# Patient Record
Sex: MALE | Race: WHITE | NOT HISPANIC OR LATINO | Employment: FULL TIME | URBAN - NONMETROPOLITAN AREA
[De-identification: names, ages, dates, MRNs, and addresses within clinical notes are randomized per-mention and may not be internally consistent; named-entity substitution may affect disease eponyms.]

---

## 2020-01-03 ENCOUNTER — HOSPITAL ENCOUNTER (OUTPATIENT)
Dept: GENERAL RADIOLOGY | Facility: HOSPITAL | Age: 55
Discharge: HOME | End: 2020-01-03
Admitting: NURSE PRACTITIONER

## 2020-01-03 ENCOUNTER — APPOINTMENT (OUTPATIENT)
Dept: CT IMAGING | Facility: HOSPITAL | Age: 55
End: 2020-01-03

## 2020-01-03 ENCOUNTER — HOSPITAL ENCOUNTER (INPATIENT)
Facility: HOSPITAL | Age: 55
LOS: 3 days | Discharge: HOME | End: 2020-01-06
Attending: EMERGENCY MEDICINE | Admitting: SPECIALIST

## 2020-01-03 ENCOUNTER — TRANSCRIBE ORDERS (OUTPATIENT)
Dept: GENERAL RADIOLOGY | Facility: HOSPITAL | Age: 55
End: 2020-01-03

## 2020-01-03 DIAGNOSIS — R10.30 LOWER ABDOMINAL PAIN: ICD-10-CM

## 2020-01-03 DIAGNOSIS — K56.600 PARTIAL SMALL BOWEL OBSTRUCTION (HCC): ICD-10-CM

## 2020-01-03 DIAGNOSIS — R10.30 LOWER ABDOMINAL PAIN: Primary | ICD-10-CM

## 2020-01-03 LAB
ALBUMIN SERPL-MCNC: 4.3 G/DL (ref 3.5–5.2)
ALBUMIN/GLOB SERPL: 1.7 G/DL
ALP SERPL-CCNC: 45 U/L (ref 39–117)
ALT SERPL W P-5'-P-CCNC: 25 U/L (ref 1–41)
ANION GAP SERPL CALCULATED.3IONS-SCNC: 13 MMOL/L (ref 5–15)
APTT PPP: 29.7 SECONDS (ref 24.1–35)
AST SERPL-CCNC: 28 U/L (ref 1–40)
BASOPHILS # BLD AUTO: 0.02 10*3/MM3 (ref 0–0.2)
BASOPHILS NFR BLD AUTO: 0.5 % (ref 0–1.5)
BILIRUB SERPL-MCNC: 0.6 MG/DL (ref 0.2–1.2)
BUN BLD-MCNC: 13 MG/DL (ref 6–20)
BUN/CREAT SERPL: 17.8 (ref 7–25)
CALCIUM SPEC-SCNC: 9 MG/DL (ref 8.6–10.5)
CHLORIDE SERPL-SCNC: 102 MMOL/L (ref 98–107)
CO2 SERPL-SCNC: 23 MMOL/L (ref 22–29)
CREAT BLD-MCNC: 0.73 MG/DL (ref 0.76–1.27)
D-LACTATE SERPL-SCNC: 0.7 MMOL/L (ref 0.5–2)
DEPRECATED RDW RBC AUTO: 36.6 FL (ref 37–54)
EOSINOPHIL # BLD AUTO: 0.07 10*3/MM3 (ref 0–0.4)
EOSINOPHIL NFR BLD AUTO: 1.6 % (ref 0.3–6.2)
ERYTHROCYTE [DISTWIDTH] IN BLOOD BY AUTOMATED COUNT: 11.4 % (ref 12.3–15.4)
GFR SERPL CREATININE-BSD FRML MDRD: 113 ML/MIN/1.73
GLOBULIN UR ELPH-MCNC: 2.6 GM/DL
GLUCOSE BLD-MCNC: 120 MG/DL (ref 65–99)
HCT VFR BLD AUTO: 40.7 % (ref 37.5–51)
HGB BLD-MCNC: 14.8 G/DL (ref 13–17.7)
HOLD SPECIMEN: NORMAL
HOLD SPECIMEN: NORMAL
IMM GRANULOCYTES # BLD AUTO: 0.01 10*3/MM3 (ref 0–0.05)
IMM GRANULOCYTES NFR BLD AUTO: 0.2 % (ref 0–0.5)
INR PPP: 0.93 (ref 0.91–1.09)
LIPASE SERPL-CCNC: 19 U/L (ref 13–60)
LYMPHOCYTES # BLD AUTO: 0.88 10*3/MM3 (ref 0.7–3.1)
LYMPHOCYTES NFR BLD AUTO: 19.9 % (ref 19.6–45.3)
MCH RBC QN AUTO: 32.1 PG (ref 26.6–33)
MCHC RBC AUTO-ENTMCNC: 36.4 G/DL (ref 31.5–35.7)
MCV RBC AUTO: 88.3 FL (ref 79–97)
MONOCYTES # BLD AUTO: 0.62 10*3/MM3 (ref 0.1–0.9)
MONOCYTES NFR BLD AUTO: 14 % (ref 5–12)
NEUTROPHILS # BLD AUTO: 2.82 10*3/MM3 (ref 1.7–7)
NEUTROPHILS NFR BLD AUTO: 63.8 % (ref 42.7–76)
NRBC BLD AUTO-RTO: 0 /100 WBC (ref 0–0.2)
PLATELET # BLD AUTO: 194 10*3/MM3 (ref 140–450)
PMV BLD AUTO: 10.6 FL (ref 6–12)
POTASSIUM BLD-SCNC: 3.9 MMOL/L (ref 3.5–5.2)
PROT SERPL-MCNC: 6.9 G/DL (ref 6–8.5)
PROTHROMBIN TIME: 12.7 SECONDS (ref 11.9–14.6)
RBC # BLD AUTO: 4.61 10*6/MM3 (ref 4.14–5.8)
SODIUM BLD-SCNC: 138 MMOL/L (ref 136–145)
WBC NRBC COR # BLD: 4.42 10*3/MM3 (ref 3.4–10.8)
WHOLE BLOOD HOLD SPECIMEN: NORMAL
WHOLE BLOOD HOLD SPECIMEN: NORMAL

## 2020-01-03 PROCEDURE — 74177 CT ABD & PELVIS W/CONTRAST: CPT

## 2020-01-03 PROCEDURE — G0378 HOSPITAL OBSERVATION PER HR: HCPCS

## 2020-01-03 PROCEDURE — 94799 UNLISTED PULMONARY SVC/PX: CPT

## 2020-01-03 PROCEDURE — 83605 ASSAY OF LACTIC ACID: CPT | Performed by: EMERGENCY MEDICINE

## 2020-01-03 PROCEDURE — 83690 ASSAY OF LIPASE: CPT | Performed by: EMERGENCY MEDICINE

## 2020-01-03 PROCEDURE — 80053 COMPREHEN METABOLIC PANEL: CPT | Performed by: EMERGENCY MEDICINE

## 2020-01-03 PROCEDURE — 36415 COLL VENOUS BLD VENIPUNCTURE: CPT

## 2020-01-03 PROCEDURE — 85025 COMPLETE CBC W/AUTO DIFF WBC: CPT | Performed by: EMERGENCY MEDICINE

## 2020-01-03 PROCEDURE — 25010000002 MORPHINE PER 10 MG: Performed by: SPECIALIST

## 2020-01-03 PROCEDURE — 25010000002 CEFOXITIN PER 1 G: Performed by: EMERGENCY MEDICINE

## 2020-01-03 PROCEDURE — 99283 EMERGENCY DEPT VISIT LOW MDM: CPT

## 2020-01-03 PROCEDURE — 85610 PROTHROMBIN TIME: CPT | Performed by: EMERGENCY MEDICINE

## 2020-01-03 PROCEDURE — 74018 RADEX ABDOMEN 1 VIEW: CPT

## 2020-01-03 PROCEDURE — 25010000002 IOPAMIDOL 61 % SOLUTION: Performed by: EMERGENCY MEDICINE

## 2020-01-03 PROCEDURE — 25010000002 ENOXAPARIN PER 10 MG: Performed by: SPECIALIST

## 2020-01-03 PROCEDURE — 85730 THROMBOPLASTIN TIME PARTIAL: CPT | Performed by: EMERGENCY MEDICINE

## 2020-01-03 RX ORDER — SODIUM CHLORIDE 0.9 % (FLUSH) 0.9 %
10 SYRINGE (ML) INJECTION AS NEEDED
Status: DISCONTINUED | OUTPATIENT
Start: 2020-01-03 | End: 2020-01-06 | Stop reason: HOSPADM

## 2020-01-03 RX ORDER — CEFOXITIN SODIUM 1 G/50ML
1 INJECTION, SOLUTION INTRAVENOUS EVERY 6 HOURS
Status: DISCONTINUED | OUTPATIENT
Start: 2020-01-03 | End: 2020-01-03 | Stop reason: SDUPTHER

## 2020-01-03 RX ORDER — ONDANSETRON 8 MG/1
8 TABLET, ORALLY DISINTEGRATING ORAL EVERY 6 HOURS PRN
Status: DISCONTINUED | OUTPATIENT
Start: 2020-01-03 | End: 2020-01-06 | Stop reason: HOSPADM

## 2020-01-03 RX ORDER — ONDANSETRON HCL IN 0.9 % NACL 8 MG/50 ML
8 INTRAVENOUS SOLUTION, PIGGYBACK (ML) INTRAVENOUS EVERY 6 HOURS PRN
Status: DISCONTINUED | OUTPATIENT
Start: 2020-01-03 | End: 2020-01-06 | Stop reason: HOSPADM

## 2020-01-03 RX ORDER — SODIUM CHLORIDE 0.9 % (FLUSH) 0.9 %
10 SYRINGE (ML) INJECTION EVERY 12 HOURS SCHEDULED
Status: DISCONTINUED | OUTPATIENT
Start: 2020-01-03 | End: 2020-01-06 | Stop reason: HOSPADM

## 2020-01-03 RX ORDER — DEXTROSE, SODIUM CHLORIDE, AND POTASSIUM CHLORIDE 5; .45; .15 G/100ML; G/100ML; G/100ML
75 INJECTION INTRAVENOUS CONTINUOUS
Status: DISCONTINUED | OUTPATIENT
Start: 2020-01-03 | End: 2020-01-06 | Stop reason: HOSPADM

## 2020-01-03 RX ORDER — SUCRALFATE ORAL 1 G/10ML
1 SUSPENSION ORAL EVERY 6 HOURS SCHEDULED
Status: DISCONTINUED | OUTPATIENT
Start: 2020-01-03 | End: 2020-01-06 | Stop reason: HOSPADM

## 2020-01-03 RX ORDER — SIMETHICONE 80 MG
80 TABLET,CHEWABLE ORAL 4 TIMES DAILY PRN
Status: DISCONTINUED | OUTPATIENT
Start: 2020-01-03 | End: 2020-01-06 | Stop reason: HOSPADM

## 2020-01-03 RX ORDER — LISINOPRIL 20 MG/1
20 TABLET ORAL DAILY
COMMUNITY
End: 2022-11-30

## 2020-01-03 RX ADMIN — MORPHINE SULFATE 4 MG: 4 INJECTION, SOLUTION INTRAMUSCULAR; INTRAVENOUS at 20:33

## 2020-01-03 RX ADMIN — SODIUM CHLORIDE, POTASSIUM CHLORIDE, SODIUM LACTATE AND CALCIUM CHLORIDE 1000 ML: 600; 310; 30; 20 INJECTION, SOLUTION INTRAVENOUS at 20:16

## 2020-01-03 RX ADMIN — CEFOXITIN 1 G: 1 INJECTION, POWDER, FOR SOLUTION INTRAVENOUS at 22:19

## 2020-01-03 RX ADMIN — ENOXAPARIN SODIUM 30 MG: 30 INJECTION SUBCUTANEOUS at 22:19

## 2020-01-03 RX ADMIN — POTASSIUM CHLORIDE, DEXTROSE MONOHYDRATE AND SODIUM CHLORIDE 125 ML/HR: 150; 5; 450 INJECTION, SOLUTION INTRAVENOUS at 20:12

## 2020-01-03 RX ADMIN — IOPAMIDOL 100 ML: 612 INJECTION, SOLUTION INTRAVENOUS at 15:25

## 2020-01-03 RX ADMIN — SUCRALFATE 1 G: 1 SUSPENSION ORAL at 20:17

## 2020-01-03 RX ADMIN — MORPHINE SULFATE 4 MG: 4 INJECTION, SOLUTION INTRAMUSCULAR; INTRAVENOUS at 23:16

## 2020-01-03 RX ADMIN — METRONIDAZOLE 500 MG: 500 INJECTION, SOLUTION INTRAVENOUS at 20:31

## 2020-01-03 NOTE — ED PROVIDER NOTES
Subjective   54-year-old gentleman with a past medical history of hypertension who presents the emergency department with chief complaint of abdominal pain.  On Monday he had gradual onset abdominal pain.  It is intermittent.  Dull.  Moderate in severity.  No exacerbating relieving factors.  Associated with nonbloody diarrhea and nausea but no vomiting.  He denies any chest pain, shortness of breath, numbness, weakness, paresthesias, dysuria, hematuria, testicular pain, he does endorse fevers that have been unmeasured.    He was evaluated with a KUB prior to arrival here concerning for ileus versus obstruction so was sent here for further evaluation.    Past medical history: Hypertension  Social history: Denies tobacco, illicit drug use, drinks alcohol socially      History provided by:  Patient      Review of Systems   All other systems reviewed and are negative.      Past Medical History:   Diagnosis Date   • Gastric ulcer    • Hypertension    • Melanoma (CMS/HCC)        No Known Allergies    Past Surgical History:   Procedure Laterality Date   • BACK SURGERY         History reviewed. No pertinent family history.    Social History     Socioeconomic History   • Marital status:      Spouse name: Not on file   • Number of children: Not on file   • Years of education: Not on file   • Highest education level: Not on file   Tobacco Use   • Smoking status: Never Smoker   Substance and Sexual Activity   • Alcohol use: Yes     Alcohol/week: 7.0 standard drinks     Types: 7 Standard drinks or equivalent per week     Comment: 1-2 drinks per day           Objective   Physical Exam   Constitutional: He appears well-developed and well-nourished. No distress.   HENT:   Head: Normocephalic and atraumatic.   Eyes: Conjunctivae and EOM are normal. Right eye exhibits no discharge. Left eye exhibits no discharge.   Neck: Normal range of motion. Neck supple. No JVD present. No tracheal deviation present.   Cardiovascular:  Normal rate, regular rhythm, normal heart sounds and intact distal pulses. Exam reveals no gallop and no friction rub.   No murmur heard.  Pulmonary/Chest: Effort normal and breath sounds normal. No stridor.   Abdominal: Soft. Bowel sounds are normal. He exhibits no distension. There is tenderness.   Tenderness to palpation of the lower abdomen, no rebound, no guarding, no tenderness elsewhere.   Musculoskeletal: Normal range of motion. He exhibits no edema or deformity.   Neurological: He is alert.   Skin: Skin is warm and dry. Capillary refill takes less than 2 seconds. No rash noted. He is not diaphoretic. No pallor.   Psychiatric: He has a normal mood and affect. His behavior is normal.   Nursing note and vitals reviewed.      Procedures           ED Course                                               MDM  Number of Diagnoses or Management Options  Lower abdominal pain: new and requires workup  Partial small bowel obstruction (CMS/HCC):   Diagnosis management comments: Patient presents with abdominal pain, nausea, diarrhea, fever.  Upon arrival he is in no acute distress, vitals are reassuring.  He does not want pain medications at this time.  He is evaluated with labs as well as a CT scan of the abdomen and pelvis with contrast.Lab work shows CBC with no concerning findings, CMP with no gross electrolyte abnormalities, no signs of kidney injury, no elevation of the anion gap, no evidence of biliary tract pathology, lipase is normal, INR 0.9, PTT is 29.7, lactic acid is 0.7, CT scan of the abdomen and pelvis with contrast is remarkable for findings consistent with a partial small bowel obstruction.  Due to this I consult general surgery, they have come to evaluate the patient.  They have admitted the patient to their service in stable condition.       Amount and/or Complexity of Data Reviewed  Clinical lab tests: ordered and reviewed  Tests in the radiology section of CPT®: ordered and reviewed  Discuss the  patient with other providers: yes (Dr. Benson)    Risk of Complications, Morbidity, and/or Mortality  Presenting problems: moderate  Diagnostic procedures: low  Management options: moderate    Patient Progress  Patient progress: stable      Final diagnoses:   Lower abdominal pain   Partial small bowel obstruction (CMS/HCC)            Avinash Braun MD  01/03/20 2100

## 2020-01-03 NOTE — H&P
Patient Care Team:  Provider, No Known as PCP - General    Chief complaint abdominal distention  Subjective     Subjective     Jair Spencer  is a 54 y.o. male presents with a four-day history of progressive abdominal distention he had history of central abdominal pain, cramping, diarrhea 4 days prior with 4-6 loose yellow bowel movements noted.  Over the next 4 days he has had some distention he had some nausea and abdominal fullness with this he presented to the emergency room showing small bowel and large bowel distention his white count is 4 with elevated monocytes probable viral syndrome.  He had a CT scan completed showing some small bowel distention he has had no previous surgery and with this abdominal distention and no bowel movements he is admitted for care this is probable viral gastroenteritis but we need to assure there is no bowel obstruction I do not appreciate any enlarged adenopathy no history of any surgery I doubt this is a surgical bowel obstruction but will admit and monitor.    Past surgical history significant for back surgery in the past on his lumbar spine, also upper endoscopies at least 2 and also a colonoscopy approximately 10 years prior.  Also melanoma excision from the right upper abdomen.    Medical problems significant for hypertension, peptic ulcer disease secondary to NSAIDs,    He is a manager for [employer].    Medications lisinopril, tamsulosin.    Non-smoker minimal drinking 1 sixpack per week.    No known drug allergies.      Review of Systems   Pertinent items are noted in HPI, all other systems reviewed and negative    History  Past Medical History:   Diagnosis Date   • Gastric ulcer    • Melanoma (CMS/HCC)      Past Surgical History:   Procedure Laterality Date   • BACK SURGERY       History reviewed. No pertinent family history.  Social History     Tobacco Use   • Smoking status: Never Smoker   Substance Use Topics   • Alcohol use: Yes     Alcohol/week: 7.0 standard drinks      Types: 7 Standard drinks or equivalent per week     Comment: 1-2 drinks per day   • Drug use: Not on file       (Not in a hospital admission)  Allergies:  Patient has no known allergies.    Problem list  There is no problem list on file for this patient.      Objective      Objective     Vital Signs  Temp:  [98.5 °F (36.9 °C)] 98.5 °F (36.9 °C)  Heart Rate:  [87] 87  Resp:  [16] 16  BP: (117)/(73) 117/73    Physical Exam:      General Appearance:    Alert, cooperative, in no acute distress   Head:    Normocephalic, without obvious abnormality, atraumatic   Eyes:            Lids and lashes normal, conjunctivae and sclerae normal, no   icterus, no pallor, corneas clear, PERRLA   Ears:    Ears appear intact with no abnormalities noted   Throat:   No oral lesions, no thrush, oral mucosa moist   Neck:   No adenopathy, supple, trachea midline, no thyromegaly, no   carotid bruit, no JVD   Back:     No kyphosis present, no scoliosis present, no skin lesions,      erythema or scars, no tenderness to percussion or                   palpation,   range of motion normal   Lungs:     Clear to auscultation,respirations regular, even and                  unlabored    Heart:    Regular rhythm and normal rate, normal S1 and S2, no            murmur, no gallop, no rub, no click   Chest Wall:    No abnormalities observed   Abdomen:    Distended abdomen, occasional bowel sounds.  No masses.  No tenderness no obvious hernia.  No adenopathy in the right or left groin or right or left axilla.   Rectal:     Deferred   Extremities:   Moves all extremities well, no edema, no cyanosis, no             redness   Pulses:   Pulses palpable and equal bilaterally   Skin:   No bleeding, bruising or rash   Lymph nodes:   No palpable adenopathy   Neurologic:   Cranial nerves 2 - 12 grossly intact, sensation intact, DTR       present and equal bilaterally       Results Review:    I reviewed the patient's new imaging results and agree with the  interpretation.    Results from last 7 days   Lab Units 01/03/20  1421   WBC 10*3/mm3 4.42   HEMOGLOBIN g/dL 14.8   HEMATOCRIT % 40.7   PLATELETS 10*3/mm3 194        Results from last 7 days   Lab Units 01/03/20  1421   SODIUM mmol/L 138   POTASSIUM mmol/L 3.9   CHLORIDE mmol/L 102   CO2 mmol/L 23.0   BUN mg/dL 13   CREATININE mg/dL 0.73*   CALCIUM mg/dL 9.0   BILIRUBIN mg/dL 0.6   ALK PHOS U/L 45   ALT (SGPT) U/L 25   AST (SGOT) U/L 28   GLUCOSE mg/dL 120*       Assessment/Plan     Assessment/Plan       * No active hospital problems. *      Patient with probable ileus due to a viral syndrome, I doubt this is a surgical issue, will admit, hydrate, n.p.o., check a KUB in the morning, probable Gastrografin small bowel follow-through on Saturday and a completion colonoscopy as an outpatient.  To assure no colonic problems.  We will continue fluid and hydration and treat this as a viral syndrome.    I discussed the patients findings and my recommendations with patient, family and nursing staff.     Gunner Benson MD  01/03/20  5:16 PM    Time:Time spent with the patient 30 minutes     EMR Dragon/Transcription disclaimer: Much of this encounter note is an electronic transcription/translation of spoken language to printed text. The electronic translation of spoken language may permit erroneous, or at times, nonsensical words or phrases to be inadvertently transcribed; although I have reviewed the note for such errors, some may still exist.

## 2020-01-04 ENCOUNTER — APPOINTMENT (OUTPATIENT)
Dept: GENERAL RADIOLOGY | Facility: HOSPITAL | Age: 55
End: 2020-01-04

## 2020-01-04 LAB
ALBUMIN SERPL-MCNC: 3.3 G/DL (ref 3.5–5.2)
ALBUMIN/GLOB SERPL: 1.3 G/DL
ALP SERPL-CCNC: 37 U/L (ref 39–117)
ALT SERPL W P-5'-P-CCNC: 23 U/L (ref 1–41)
ANION GAP SERPL CALCULATED.3IONS-SCNC: 7 MMOL/L (ref 5–15)
AST SERPL-CCNC: 18 U/L (ref 1–40)
BILIRUB SERPL-MCNC: 0.4 MG/DL (ref 0.2–1.2)
BUN BLD-MCNC: 9 MG/DL (ref 6–20)
BUN/CREAT SERPL: 9.7 (ref 7–25)
CALCIUM SPEC-SCNC: 8.2 MG/DL (ref 8.6–10.5)
CHLORIDE SERPL-SCNC: 103 MMOL/L (ref 98–107)
CO2 SERPL-SCNC: 28 MMOL/L (ref 22–29)
CREAT BLD-MCNC: 0.93 MG/DL (ref 0.76–1.27)
DEPRECATED RDW RBC AUTO: 37.8 FL (ref 37–54)
EOSINOPHIL # BLD MANUAL: 0.08 10*3/MM3 (ref 0–0.4)
EOSINOPHIL NFR BLD MANUAL: 2 % (ref 0.3–6.2)
ERYTHROCYTE [DISTWIDTH] IN BLOOD BY AUTOMATED COUNT: 11.3 % (ref 12.3–15.4)
ERYTHROCYTE [SEDIMENTATION RATE] IN BLOOD: 2 MM/HR (ref 0–15)
GFR SERPL CREATININE-BSD FRML MDRD: 85 ML/MIN/1.73
GIANT PLATELETS: ABNORMAL
GLOBULIN UR ELPH-MCNC: 2.5 GM/DL
GLUCOSE BLD-MCNC: 114 MG/DL (ref 65–99)
HCT VFR BLD AUTO: 36.4 % (ref 37.5–51)
HGB BLD-MCNC: 12.9 G/DL (ref 13–17.7)
LYMPHOCYTES # BLD MANUAL: 0.97 10*3/MM3 (ref 0.7–3.1)
LYMPHOCYTES NFR BLD MANUAL: 11 % (ref 5–12)
LYMPHOCYTES NFR BLD MANUAL: 24 % (ref 19.6–45.3)
MCH RBC QN AUTO: 32.3 PG (ref 26.6–33)
MCHC RBC AUTO-ENTMCNC: 35.4 G/DL (ref 31.5–35.7)
MCV RBC AUTO: 91 FL (ref 79–97)
MONOCYTES # BLD AUTO: 0.44 10*3/MM3 (ref 0.1–0.9)
NEUTROPHILS # BLD AUTO: 2.38 10*3/MM3 (ref 1.7–7)
NEUTROPHILS NFR BLD MANUAL: 50 % (ref 42.7–76)
NEUTS BAND NFR BLD MANUAL: 9 % (ref 0–5)
PLATELET # BLD AUTO: 182 10*3/MM3 (ref 140–450)
PMV BLD AUTO: 10.6 FL (ref 6–12)
POTASSIUM BLD-SCNC: 4 MMOL/L (ref 3.5–5.2)
PROT SERPL-MCNC: 5.8 G/DL (ref 6–8.5)
RBC # BLD AUTO: 4 10*6/MM3 (ref 4.14–5.8)
RBC MORPH BLD: NORMAL
SODIUM BLD-SCNC: 138 MMOL/L (ref 136–145)
VARIANT LYMPHS NFR BLD MANUAL: 4 % (ref 0–5)
WBC MORPH BLD: NORMAL
WBC NRBC COR # BLD: 4.03 10*3/MM3 (ref 3.4–10.8)

## 2020-01-04 PROCEDURE — G0378 HOSPITAL OBSERVATION PER HR: HCPCS

## 2020-01-04 PROCEDURE — 85027 COMPLETE CBC AUTOMATED: CPT | Performed by: SPECIALIST

## 2020-01-04 PROCEDURE — 25010000002 CEFOXITIN PER 1 G: Performed by: EMERGENCY MEDICINE

## 2020-01-04 PROCEDURE — 36415 COLL VENOUS BLD VENIPUNCTURE: CPT | Performed by: SPECIALIST

## 2020-01-04 PROCEDURE — 25010000002 ENOXAPARIN PER 10 MG: Performed by: SPECIALIST

## 2020-01-04 PROCEDURE — 85007 BL SMEAR W/DIFF WBC COUNT: CPT | Performed by: SPECIALIST

## 2020-01-04 PROCEDURE — 85651 RBC SED RATE NONAUTOMATED: CPT | Performed by: SPECIALIST

## 2020-01-04 PROCEDURE — 74018 RADEX ABDOMEN 1 VIEW: CPT

## 2020-01-04 PROCEDURE — 80053 COMPREHEN METABOLIC PANEL: CPT | Performed by: SPECIALIST

## 2020-01-04 PROCEDURE — 25010000002 CEFOXITIN PER 1 G: Performed by: SPECIALIST

## 2020-01-04 PROCEDURE — 74250 X-RAY XM SM INT 1CNTRST STD: CPT

## 2020-01-04 RX ORDER — TAMSULOSIN HYDROCHLORIDE 0.4 MG/1
1 CAPSULE ORAL DAILY
COMMUNITY

## 2020-01-04 RX ORDER — DICYCLOMINE HCL 20 MG
20 TABLET ORAL 3 TIMES DAILY
COMMUNITY
Start: 2020-01-03 | End: 2020-01-10

## 2020-01-04 RX ORDER — TADALAFIL 20 MG/1
40 TABLET ORAL
COMMUNITY
End: 2022-11-30

## 2020-01-04 RX ORDER — ONDANSETRON 4 MG/1
4 TABLET, ORALLY DISINTEGRATING ORAL EVERY 8 HOURS PRN
Status: ON HOLD | COMMUNITY
End: 2020-03-16

## 2020-01-04 RX ADMIN — CEFOXITIN SODIUM 1 G: 1 POWDER, FOR SOLUTION INTRAVENOUS at 23:27

## 2020-01-04 RX ADMIN — SUCRALFATE 1 G: 1 SUSPENSION ORAL at 23:27

## 2020-01-04 RX ADMIN — POTASSIUM CHLORIDE, DEXTROSE MONOHYDRATE AND SODIUM CHLORIDE 125 ML/HR: 150; 5; 450 INJECTION, SOLUTION INTRAVENOUS at 19:41

## 2020-01-04 RX ADMIN — ENOXAPARIN SODIUM 30 MG: 30 INJECTION SUBCUTANEOUS at 08:32

## 2020-01-04 RX ADMIN — CEFOXITIN 1 G: 1 INJECTION, POWDER, FOR SOLUTION INTRAVENOUS at 09:30

## 2020-01-04 RX ADMIN — METRONIDAZOLE 500 MG: 500 INJECTION, SOLUTION INTRAVENOUS at 08:32

## 2020-01-04 RX ADMIN — SUCRALFATE 1 G: 1 SUSPENSION ORAL at 00:05

## 2020-01-04 RX ADMIN — METRONIDAZOLE 500 MG: 500 INJECTION, SOLUTION INTRAVENOUS at 15:27

## 2020-01-04 RX ADMIN — METRONIDAZOLE 500 MG: 500 INJECTION, SOLUTION INTRAVENOUS at 20:40

## 2020-01-04 RX ADMIN — SODIUM CHLORIDE, PRESERVATIVE FREE 10 ML: 5 INJECTION INTRAVENOUS at 08:32

## 2020-01-04 RX ADMIN — CEFOXITIN SODIUM 1 G: 1 POWDER, FOR SOLUTION INTRAVENOUS at 15:29

## 2020-01-04 RX ADMIN — POTASSIUM CHLORIDE, DEXTROSE MONOHYDRATE AND SODIUM CHLORIDE 125 ML/HR: 150; 5; 450 INJECTION, SOLUTION INTRAVENOUS at 07:11

## 2020-01-04 RX ADMIN — SUCRALFATE 1 G: 1 SUSPENSION ORAL at 11:22

## 2020-01-04 RX ADMIN — ENOXAPARIN SODIUM 30 MG: 30 INJECTION SUBCUTANEOUS at 20:40

## 2020-01-04 RX ADMIN — SUCRALFATE 1 G: 1 SUSPENSION ORAL at 05:58

## 2020-01-04 RX ADMIN — METRONIDAZOLE 500 MG: 500 INJECTION, SOLUTION INTRAVENOUS at 02:02

## 2020-01-04 RX ADMIN — CEFOXITIN 1 G: 1 INJECTION, POWDER, FOR SOLUTION INTRAVENOUS at 04:17

## 2020-01-04 RX ADMIN — SUCRALFATE 1 G: 1 SUSPENSION ORAL at 17:36

## 2020-01-04 NOTE — PROGRESS NOTES
LOS: 0 days   Patient Care Team:  Provider, No Known as PCP - General    Chief Complaint: Small bowel obstruction  Subjective     Subjective     Patient admitted 1 day prior with abdominal distention no previous surgical interventions noted, with this appreciated is presumed that he has a viral gastroenteritis but with a note several areas of small bowel that were narrowed I think this is probably viral gastroenteritis but will need to disprove with a Gastrografin small bowel follow-through.  Objective      Objective     Vital Signs  Temp:  [97.8 °F (36.6 °C)-98.5 °F (36.9 °C)] 98.5 °F (36.9 °C)  Heart Rate:  [66-87] 66  Resp:  [14-16] 16  BP: ()/(45-75) 106/64    Intake & Output (last 3 days)       01/01 0701 - 01/02 0700 01/02 0701 - 01/03 0700 01/03 0701 - 01/04 0700 01/04 0701 - 01/05 0700    I.V. (mL/kg)    1383 (14)    Total Intake(mL/kg)    1383 (14)    Urine (mL/kg/hr)   800 600 (0.9)    Stool   0     Total Output   800 600    Net   -800 +783            Stool Unmeasured Occurrence   1 x           Physical Exam:     General Appearance:    Alert, cooperative, in no acute distress   Lungs:     Clear to auscultation,respirations regular, even and                  unlabored    Heart:    Regular rhythm and normal rate, normal S1 and S2, no            murmur, no gallop, no rub, no click   Chest Wall:    No abnormalities observed   Abdomen:    Occasional bowel sounds, slightly less distended, there is still some small bowel distention also: Gas present, not fully obstructed and in fact probably not at all white count is unchanged at 4 with 50% neutrophils 9 bands 24 lymphs.  ESR of 2.        Results Review:     I reviewed the patient's new clinical results.  I reviewed the patient's new imaging results and agree with the interpretation.    Results from last 7 days   Lab Units 01/04/20  0506 01/03/20  1421   WBC 10*3/mm3 4.03 4.42   HEMOGLOBIN g/dL 12.9* 14.8   HEMATOCRIT % 36.4* 40.7   PLATELETS 10*3/mm3  182 194        Results from last 7 days   Lab Units 01/04/20  0506 01/03/20  1421   SODIUM mmol/L 138 138   POTASSIUM mmol/L 4.0 3.9   CHLORIDE mmol/L 103 102   CO2 mmol/L 28.0 23.0   BUN mg/dL 9 13   CREATININE mg/dL 0.93 0.73*   CALCIUM mg/dL 8.2* 9.0   BILIRUBIN mg/dL 0.4 0.6   ALK PHOS U/L 37* 45   ALT (SGPT) U/L 23 25   AST (SGOT) U/L 18 28   GLUCOSE mg/dL 114* 120*       Assessment/Plan     Assessment/Plan       Lower abdominal pain      Currently plan a small bowel follow-through to assure no obstruction in this patient without previous abdominal surgeries once this is completed and I suspect this will be normal we can look toward beginning diet and discharging him Sunday or Monday.      Gunner Benson MD  01/04/20  1:25 PM      Time: time spent with patient 15 minutes     EMR Dragon/Transcription disclaimer: Much of this encounter note is an electronic transcription/translation of spoken language to printed text. The electronic translation of spoken language may permit erroneous, or at times, nonsensical words or phrases to be inadvertently transcribed; although I have reviewed the note for such errors, some may still exist.

## 2020-01-04 NOTE — PLAN OF CARE
Problem: Patient Care Overview  Goal: Plan of Care Review  Outcome: Ongoing (interventions implemented as appropriate)  Flowsheets (Taken 1/4/2020 0320)  Progress: no change  Plan of Care Reviewed With: patient; spouse  Outcome Summary: Pt admit w/ lower abd pain, possible ileus r/t viral syndrome. Pt c/o diarrhea for 4-6 days prior to admission. Abd distended, tender, c/o moderate pain, PRN IV pain med w/ effective results. IVF, IV abx as ordered. KUB this AM, possible SBFT. Cont to monitor.

## 2020-01-05 ENCOUNTER — APPOINTMENT (OUTPATIENT)
Dept: GENERAL RADIOLOGY | Facility: HOSPITAL | Age: 55
End: 2020-01-05

## 2020-01-05 PROCEDURE — 25010000002 CEFOXITIN: Performed by: SPECIALIST

## 2020-01-05 PROCEDURE — 25010000002 CEFOXITIN PER 1 G: Performed by: SPECIALIST

## 2020-01-05 PROCEDURE — 74019 RADEX ABDOMEN 2 VIEWS: CPT

## 2020-01-05 PROCEDURE — G0378 HOSPITAL OBSERVATION PER HR: HCPCS

## 2020-01-05 PROCEDURE — 25010000002 ENOXAPARIN PER 10 MG: Performed by: SPECIALIST

## 2020-01-05 RX ADMIN — METRONIDAZOLE 500 MG: 500 INJECTION, SOLUTION INTRAVENOUS at 21:15

## 2020-01-05 RX ADMIN — CEFOXITIN SODIUM 1 G: 1 POWDER, FOR SOLUTION INTRAVENOUS at 10:16

## 2020-01-05 RX ADMIN — POTASSIUM CHLORIDE, DEXTROSE MONOHYDRATE AND SODIUM CHLORIDE 75 ML/HR: 150; 5; 450 INJECTION, SOLUTION INTRAVENOUS at 20:01

## 2020-01-05 RX ADMIN — SUCRALFATE 1 G: 1 SUSPENSION ORAL at 05:10

## 2020-01-05 RX ADMIN — CEFOXITIN SODIUM 1 G: 1 POWDER, FOR SOLUTION INTRAVENOUS at 05:09

## 2020-01-05 RX ADMIN — CEFOXITIN SODIUM 1 G: 1 POWDER, FOR SOLUTION INTRAVENOUS at 23:02

## 2020-01-05 RX ADMIN — METRONIDAZOLE 500 MG: 500 INJECTION, SOLUTION INTRAVENOUS at 03:08

## 2020-01-05 RX ADMIN — METRONIDAZOLE 500 MG: 500 INJECTION, SOLUTION INTRAVENOUS at 09:05

## 2020-01-05 RX ADMIN — SUCRALFATE 1 G: 1 SUSPENSION ORAL at 12:09

## 2020-01-05 RX ADMIN — SUCRALFATE 1 G: 1 SUSPENSION ORAL at 23:02

## 2020-01-05 RX ADMIN — METRONIDAZOLE 500 MG: 500 INJECTION, SOLUTION INTRAVENOUS at 14:38

## 2020-01-05 RX ADMIN — POTASSIUM CHLORIDE, DEXTROSE MONOHYDRATE AND SODIUM CHLORIDE 125 ML/HR: 150; 5; 450 INJECTION, SOLUTION INTRAVENOUS at 05:10

## 2020-01-05 RX ADMIN — SODIUM CHLORIDE, PRESERVATIVE FREE 10 ML: 5 INJECTION INTRAVENOUS at 21:16

## 2020-01-05 RX ADMIN — ENOXAPARIN SODIUM 30 MG: 30 INJECTION SUBCUTANEOUS at 09:05

## 2020-01-05 RX ADMIN — CEFOXITIN SODIUM 1 G: 1 POWDER, FOR SOLUTION INTRAVENOUS at 15:38

## 2020-01-05 NOTE — PROGRESS NOTES
LOS: 0 days   Patient Care Team:  [place], No Known as PCP - General    Chief Complaint: Hospital day #2 for gastroenteritis with ileus  Subjective     Subjective   Hospital day #2 for treatment of gastroenteritis with ileus, he had 4 bowel movements after the Gastrografin enema yesterday he has had one bowel movement this morning he is not nauseated, he is back to his normal size of his abdomen to me it seems still slightly distended, bowel sounds are present, slightly hypoactive.  Gastrografin shows passage into the colon by 2-1/2 hours there is some areas of narrowing but no obvious masses noted I think I will follow him up with a enteroclysis CT scan in 3 weeks to assure no problem areas  Objective      Objective     Vital Signs  Temp:  [98 °F (36.7 °C)-98.4 °F (36.9 °C)] 98.4 °F (36.9 °C)  Heart Rate:  [66-73] 71  Resp:  [14-16] 16  BP: (110-118)/(73-78) 110/73    Intake & Output (last 3 days)       01/02 0701 - 01/03 0700 01/03 0701 - 01/04 0700 01/04 0701 - 01/05 0700 01/05 0701 - 01/06 0700    I.V. (mL/kg)   3934 (39.7)     Total Intake(mL/kg)   3934 (39.7)     Urine (mL/kg/hr)  800 600 (0.3)     Stool  0 0     Total Output  800 600     Net  -800 +3334             Urine Unmeasured Occurrence   3 x     Stool Unmeasured Occurrence  1 x 1 x           Physical Exam:     General Appearance:    Alert, cooperative, in no acute distress   Lungs:     Clear to auscultation,respirations regular, even and                  unlabored    Heart:    Regular rhythm and normal rate, normal S1 and S2, no            murmur, no gallop, no rub, no click   Chest Wall:    No abnormalities observed   Abdomen:    Occasional bowel sounds, no masses, nontender, white count is reduced to 4, electrolytes within normal limits.  Sed rate is 2.  Differential shows 50% neutrophils 9 bands 24 lymphocytes and 4 atypical.        Results Review:     I reviewed the patient's new clinical results.  I reviewed the patient's new imaging results  and agree with the interpretation.    Results from last 7 days   Lab Units 01/04/20  0506 01/03/20  1421   WBC 10*3/mm3 4.03 4.42   HEMOGLOBIN g/dL 12.9* 14.8   HEMATOCRIT % 36.4* 40.7   PLATELETS 10*3/mm3 182 194        Results from last 7 days   Lab Units 01/04/20  0506 01/03/20  1421   SODIUM mmol/L 138 138   POTASSIUM mmol/L 4.0 3.9   CHLORIDE mmol/L 103 102   CO2 mmol/L 28.0 23.0   BUN mg/dL 9 13   CREATININE mg/dL 0.93 0.73*   CALCIUM mg/dL 8.2* 9.0   BILIRUBIN mg/dL 0.4 0.6   ALK PHOS U/L 37* 45   ALT (SGPT) U/L 23 25   AST (SGOT) U/L 18 28   GLUCOSE mg/dL 114* 120*       Assessment/Plan     Assessment/Plan       Lower abdominal pain      Patient with a viral gastroenteritis no obstruction by small bowel follow-through plan to advance him to a regular diet continue to increase his activity look toward discharging on Monday and then have him set up for a enteroclysis study in 3 weeks to assure no colonic problems.    Or small bowel problems  Gunner Benson MD  01/05/20  10:55 AM      Time: time spent with patient 15 minutes     EMR Dragon/Transcription disclaimer: Much of this encounter note is an electronic transcription/translation of spoken language to printed text. The electronic translation of spoken language may permit erroneous, or at times, nonsensical words or phrases to be inadvertently transcribed; although I have reviewed the note for such errors, some may still exist.

## 2020-01-06 VITALS
RESPIRATION RATE: 16 BRPM | TEMPERATURE: 97.8 F | WEIGHT: 218.2 LBS | DIASTOLIC BLOOD PRESSURE: 75 MMHG | HEART RATE: 82 BPM | HEIGHT: 71 IN | OXYGEN SATURATION: 98 % | BODY MASS INDEX: 30.55 KG/M2 | SYSTOLIC BLOOD PRESSURE: 108 MMHG

## 2020-01-06 PROCEDURE — 25010000002 CEFOXITIN PER 1 G: Performed by: SPECIALIST

## 2020-01-06 PROCEDURE — G0378 HOSPITAL OBSERVATION PER HR: HCPCS

## 2020-01-06 RX ADMIN — SUCRALFATE 1 G: 1 SUSPENSION ORAL at 06:51

## 2020-01-06 RX ADMIN — CEFOXITIN SODIUM 1 G: 1 POWDER, FOR SOLUTION INTRAVENOUS at 10:24

## 2020-01-06 RX ADMIN — METRONIDAZOLE 500 MG: 500 INJECTION, SOLUTION INTRAVENOUS at 03:29

## 2020-01-06 RX ADMIN — METRONIDAZOLE 500 MG: 500 INJECTION, SOLUTION INTRAVENOUS at 11:33

## 2020-01-06 RX ADMIN — CEFOXITIN SODIUM 1 G: 1 POWDER, FOR SOLUTION INTRAVENOUS at 04:56

## 2020-01-06 RX ADMIN — METRONIDAZOLE 500 MG: 500 INJECTION, SOLUTION INTRAVENOUS at 15:09

## 2020-01-06 NOTE — PLAN OF CARE
Pt safety maintained, pt tolerating diet, poss d/c in am, ivf and antibiotics continue, no c/o pain, up adlib, will monitor

## 2020-01-06 NOTE — PAYOR COMM NOTE
"ADMIT INPT 1-3-20  [id number]  UR PHONE    [phone]    Jair Paulino (54 y.o. Male)     Date of Birth Social Security Number Address Home Phone MRN    1965  [address] [facility] [address] [phone] [id number]    Hinduism Marital Status          Other        Admission Date Admission Type Admitting Provider Attending Provider Department, Room/Bed    1/3/20 Emergency Gunner Benson MD Stigall, Kevin E, MD [facility] 3C, 393/1    Discharge Date Discharge Disposition Discharge Destination                       Attending Provider:  Gunner Benson MD    Allergies:  No Known Allergies    Isolation:  None   Infection:  None   Code Status:  CPR    Ht:  180.3 cm (71\")   Wt:  99 kg (218 lb 3.2 oz)    Admission Cmt:  None   Principal Problem:  None                Active Insurance as of 1/3/2020     Primary Coverage     Payor Plan Insurance Group Employer/Plan Group    [employer] BLUE CROSS [facility] BLUE CROSS BLUE [facility] PPO [id number]     Payor Plan Address Payor Plan Phone Number Payor Plan Fax Number Effective Dates    [address] [phone]  1/1/2019 - None Entered    [facility] [id number]       Subscriber Name Subscriber Birth Date Member ID       JAIR PAULINO 1965 [id number]                 Emergency Contacts      (Rel.) Home Phone Work Phone Mobile Phone    sangeeta paulino (Spouse) -- -- [phone]               History & Physical      Gunner Benson MD at 01/03/20 1716                Patient Care Team:  Provider, No Known as PCP - General    Chief complaint abdominal distention  Subjective     Subjective     Jair Paulino  is a 54 y.o. male presents with a four-day history of progressive abdominal distention he had history of central abdominal pain, cramping, diarrhea 4 days prior with 4-6 loose yellow bowel movements noted.  Over the next 4 days he has had some distention he had some nausea and abdominal fullness with this he " presented to the emergency room showing small bowel and large bowel distention his white count is 4 with elevated monocytes probable viral syndrome.  He had a CT scan completed showing some small bowel distention he has had no previous surgery and with this abdominal distention and no bowel movements he is admitted for care this is probable viral gastroenteritis but we need to assure there is no bowel obstruction I do not appreciate any enlarged adenopathy no history of any surgery I doubt this is a surgical bowel obstruction but will admit and monitor.    Past surgical history significant for back surgery in the past on his lumbar spine, also upper endoscopies at least 2 and also a colonoscopy approximately 10 years prior.  Also melanoma excision from the right upper abdomen.    Medical problems significant for hypertension, peptic ulcer disease secondary to NSAIDs,    He is a manager for [employer].    Medications lisinopril, tamsulosin.    Non-smoker minimal drinking 1 sixpack per week.    No known drug allergies.      Review of Systems   Pertinent items are noted in HPI, all other systems reviewed and negative    History  Past Medical History:   Diagnosis Date   • Gastric ulcer    • Melanoma (CMS/HCC)      Past Surgical History:   Procedure Laterality Date   • BACK SURGERY       History reviewed. No pertinent family history.  Social History     Tobacco Use   • Smoking status: Never Smoker   Substance Use Topics   • Alcohol use: Yes     Alcohol/week: 7.0 standard drinks     Types: 7 Standard drinks or equivalent per week     Comment: 1-2 drinks per day   • Drug use: Not on file       (Not in a hospital admission)  Allergies:  Patient has no known allergies.    Problem list  There is no problem list on file for this patient.      Objective      Objective     Vital Signs  Temp:  [98.5 °F (36.9 °C)] 98.5 °F (36.9 °C)  Heart Rate:  [87] 87  Resp:  [16] 16  BP: (117)/(73) 117/73    Physical Exam:      General Appearance:     Alert, cooperative, in no acute distress   Head:    Normocephalic, without obvious abnormality, atraumatic   Eyes:            Lids and lashes normal, conjunctivae and sclerae normal, no   icterus, no pallor, corneas clear, PERRLA   Ears:    Ears appear intact with no abnormalities noted   Throat:   No oral lesions, no thrush, oral mucosa moist   Neck:   No adenopathy, supple, trachea midline, no thyromegaly, no   carotid bruit, no JVD   Back:     No kyphosis present, no scoliosis present, no skin lesions,      erythema or scars, no tenderness to percussion or                   palpation,   range of motion normal   Lungs:     Clear to auscultation,respirations regular, even and                  unlabored    Heart:    Regular rhythm and normal rate, normal S1 and S2, no            murmur, no gallop, no rub, no click   Chest Wall:    No abnormalities observed   Abdomen:    Distended abdomen, occasional bowel sounds.  No masses.  No tenderness no obvious hernia.  No adenopathy in the right or left groin or right or left axilla.   Rectal:     Deferred   Extremities:   Moves all extremities well, no edema, no cyanosis, no             redness   Pulses:   Pulses palpable and equal bilaterally   Skin:   No bleeding, bruising or rash   Lymph nodes:   No palpable adenopathy   Neurologic:   Cranial nerves 2 - 12 grossly intact, sensation intact, DTR       present and equal bilaterally       Results Review:    I reviewed the patient's new imaging results and agree with the interpretation.    Results from last 7 days   Lab Units 01/03/20  1421   WBC 10*3/mm3 4.42   HEMOGLOBIN g/dL 14.8   HEMATOCRIT % 40.7   PLATELETS 10*3/mm3 194        Results from last 7 days   Lab Units 01/03/20  1421   SODIUM mmol/L 138   POTASSIUM mmol/L 3.9   CHLORIDE mmol/L 102   CO2 mmol/L 23.0   BUN mg/dL 13   CREATININE mg/dL 0.73*   CALCIUM mg/dL 9.0   BILIRUBIN mg/dL 0.6   ALK PHOS U/L 45   ALT (SGPT) U/L 25   AST (SGOT) U/L 28   GLUCOSE mg/dL 120*        Assessment/Plan     Assessment/Plan       * No active hospital problems. *      Patient with probable ileus due to a viral syndrome, I doubt this is a surgical issue, will admit, hydrate, n.p.o., check a KUB in the morning, probable Gastrografin small bowel follow-through on Saturday and a completion colonoscopy as an outpatient.  To assure no colonic problems.  We will continue fluid and hydration and treat this as a viral syndrome.    I discussed the patients findings and my recommendations with patient, family and nursing staff.     Gunner Benson MD  01/03/20  5:16 PM    Time:Time spent with the patient 30 minutes     EMR Dragon/Transcription disclaimer: Much of this encounter note is an electronic transcription/translation of spoken language to printed text. The electronic translation of spoken language may permit erroneous, or at times, nonsensical words or phrases to be inadvertently transcribed; although I have reviewed the note for such errors, some may still exist.    Electronically signed by Gunner Benson MD at 01/03/20 1721          Emergency Department Notes      Avinash Braun MD at 01/03/20 1448          Subjective   54-year-old gentleman with a past medical history of hypertension who presents the emergency department with chief complaint of abdominal pain.  On Monday he had gradual onset abdominal pain.  It is intermittent.  Dull.  Moderate in severity.  No exacerbating relieving factors.  Associated with nonbloody diarrhea and nausea but no vomiting.  He denies any chest pain, shortness of breath, numbness, weakness, paresthesias, dysuria, hematuria, testicular pain, he does endorse fevers that have been unmeasured.    He was evaluated with a KUB prior to arrival here concerning for ileus versus obstruction so was sent here for further evaluation.    Past medical history: Hypertension  Social history: Denies tobacco, illicit drug use, drinks alcohol socially      History  provided by:  Patient      Review of Systems   All other systems reviewed and are negative.      Past Medical History:   Diagnosis Date   • Gastric ulcer    • Hypertension    • Melanoma (CMS/HCC)        No Known Allergies    Past Surgical History:   Procedure Laterality Date   • BACK SURGERY         History reviewed. No pertinent family history.    Social History     Socioeconomic History   • Marital status:      Spouse name: Not on file   • Number of children: Not on file   • Years of education: Not on file   • Highest education level: Not on file   Tobacco Use   • Smoking status: Never Smoker   Substance and Sexual Activity   • Alcohol use: Yes     Alcohol/week: 7.0 standard drinks     Types: 7 Standard drinks or equivalent per week     Comment: 1-2 drinks per day           Objective   Physical Exam   Constitutional: He appears well-developed and well-nourished. No distress.   HENT:   Head: Normocephalic and atraumatic.   Eyes: Conjunctivae and EOM are normal. Right eye exhibits no discharge. Left eye exhibits no discharge.   Neck: Normal range of motion. Neck supple. No JVD present. No tracheal deviation present.   Cardiovascular: Normal rate, regular rhythm, normal heart sounds and intact distal pulses. Exam reveals no gallop and no friction rub.   No murmur heard.  Pulmonary/Chest: Effort normal and breath sounds normal. No stridor.   Abdominal: Soft. Bowel sounds are normal. He exhibits no distension. There is tenderness.   Tenderness to palpation of the lower abdomen, no rebound, no guarding, no tenderness elsewhere.   Musculoskeletal: Normal range of motion. He exhibits no edema or deformity.   Neurological: He is alert.   Skin: Skin is warm and dry. Capillary refill takes less than 2 seconds. No rash noted. He is not diaphoretic. No pallor.   Psychiatric: He has a normal mood and affect. His behavior is normal.   Nursing note and vitals reviewed.      Procedures          ED Course                                                MDM  Number of Diagnoses or Management Options  Lower abdominal pain: new and requires workup  Partial small bowel obstruction (CMS/HCC):   Diagnosis management comments: Patient presents with abdominal pain, nausea, diarrhea, fever.  Upon arrival he is in no acute distress, vitals are reassuring.  He does not want pain medications at this time.  He is evaluated with labs as well as a CT scan of the abdomen and pelvis with contrast.Lab work shows CBC with no concerning findings, CMP with no gross electrolyte abnormalities, no signs of kidney injury, no elevation of the anion gap, no evidence of biliary tract pathology, lipase is normal, INR 0.9, PTT is 29.7, lactic acid is 0.7, CT scan of the abdomen and pelvis with contrast is remarkable for findings consistent with a partial small bowel obstruction.  Due to this I consult general surgery, they have come to evaluate the patient.  They have admitted the patient to their service in stable condition.       Amount and/or Complexity of Data Reviewed  Clinical lab tests: ordered and reviewed  Tests in the radiology section of CPT®:  ordered and reviewed  Discuss the patient with other providers: yes (Dr. Benson)    Risk of Complications, Morbidity, and/or Mortality  Presenting problems: moderate  Diagnostic procedures: low  Management options: moderate    Patient Progress  Patient progress: stable      Final diagnoses:   Lower abdominal pain   Partial small bowel obstruction (CMS/HCC)            Avinash Braun MD  01/03/20 2100      Electronically signed by Avinash Braun MD at 01/03/20 2100     Alexei Alberto RN at 01/03/20 1651        Dr. Benson at bedside     Alexei Alberto RN  01/03/20 1651      Electronically signed by Alexei Alberto RN at 01/03/20 1651       Vital Signs (last 3 days)     Date/Time   Temp   Temp src   Pulse   Resp   BP   Patient Position   SpO2    01/05/20 2008   98.7  (37.1)   Oral   66   18   107/67   Lying   98    01/05/20 1700   97.4 (36.3)   Oral   63   16   117/80   Lying   97    01/05/20 1300   98.3 (36.8)   Oral   73   16   125/72   Lying   98    01/05/20 0900   98.4 (36.9)   Oral   71   16   110/73   Lying   98    01/05/20 0219   98 (36.7)   Oral   66   14   115/78   Lying   96    01/04/20 2050   98 (36.7)   Oral   68   16   113/76   Lying   96    01/04/20 1700   98 (36.7)   Oral   73   16   118/77   Lying   98    01/04/20 0900   98.5 (36.9)   Oral   66   16   106/64   Lying   95    01/04/20 0515   97.8 (36.6)   Oral   73   16   117/63   Lying   96    01/04/20 0126   98.1 (36.7)   Oral   71   16   94/45 nurse notified   Lying   93    BP: nurse notified at 01/04/20 0126    01/03/20 2012   98.3 (36.8)   Oral   73   16   128/72   Lying   99    01/03/20 1829   98.4 (36.9)   --   77   16   106/75   Lying   97    01/03/20 17:58:23   --   --   77   14   115/66   --   97    01/03/20 1411   98.5 (36.9)   Oral   87   16   117/73   --   95              Oxygen Therapy (last 3 days)     Date/Time   SpO2   Device (Oxygen Therapy)   Flow (L/min)   Oxygen Concentration (%)   ETCO2 (mmHg)    01/05/20 2008   98   room air   --   --   --    01/05/20 1700   97   room air   --   --   --    01/05/20 1300   98   room air   --   --   --    01/05/20 0900   98   room air   --   --   --    01/05/20 0219   96   room air   --   --   --    01/04/20 2050   96   room air   --   --   --    01/04/20 1700   98   room air   --   --   --    01/04/20 0900   95   room air   --   --   --    01/04/20 0515   96   room air   --   --   --    01/04/20 0126   93   room air   --   --   --    01/03/20 2012   99   room air   --   --   --    01/03/20 1829   97   room air   --   --   --    01/03/20 17:58:23   97   --   --   --   --    01/03/20 1411   95   --   --   --   --            Intake & Output (last 3 days)       01/03 0701 - 01/04 0700 01/04 0701 - 01/05 0700 01/05 0701 - 01/06 0700    I.V. (mL/kg)  4698 (39.7)  1549 (15.6)    Total Intake(mL/kg)  3934 (39.7) 1549 (15.6)    Urine (mL/kg/hr) 800 600 (0.3)     Stool 0 0     Total Output 800 600     Net -800 +3334 +1549           Urine Unmeasured Occurrence  3 x 3 x    Stool Unmeasured Occurrence 1 x 1 x 1 x           Facility-Administered Medications as of 1/6/2020   Medication Dose Route Frequency Provider Last Rate Last Dose   • ceFOXitin (MEFOXIN) 1 g/100 mL NS (MBP)  1 g Intravenous Q6H Gunner Benson MD 0 mL/hr at 01/05/20 1615 1 g at 01/06/20 0456   • dextrose 5 % and sodium chloride 0.45 % with KCl 20 mEq/L infusion  75 mL/hr Intravenous Continuous Gunner Benson MD 75 mL/hr at 01/05/20 2305 75 mL/hr at 01/05/20 2305   • diatrizoate meglumine-sodium (GASTROGRAFIN) 66-10 % solution 360 mL  360 mL Oral Once in imaging Gunner Benson MD       • enoxaparin (LOVENOX) syringe 30 mg  30 mg Subcutaneous Q12H Gunner Benson MD   30 mg at 01/05/20 0905   • [COMPLETED] iopamidol (ISOVUE-300) 61 % injection 100 mL  100 mL Intravenous Once in imaging Avinash Braun MD   100 mL at 01/03/20 1525   • [COMPLETED] lactated ringers bolus 1,000 mL  1,000 mL Intravenous Once Gunner Benson MD   Stopped at 01/03/20 2230   • metroNIDAZOLE (FLAGYL) 500 mg/100mL IVPB  500 mg Intravenous Q6H Gunner Benson MD 0 mL/hr at 01/05/20 1533 500 mg at 01/06/20 0329   • morphine injection 4 mg  4 mg Intravenous Q1H PRN Gunner Benson MD   4 mg at 01/03/20 2316   • ondansetron (ZOFRAN) 8 mg/50 mL NS IVPB  8 mg Intravenous Q6H PRN Gunner Benson MD       • ondansetron ODT (ZOFRAN-ODT) disintegrating tablet 8 mg  8 mg Oral Q6H PRN Gunner Benson MD       • simethicone (MYLICON) chewable tablet 80 mg  80 mg Oral 4x Daily PRN Gunner Benson MD       • sodium chloride 0.9 % flush 10 mL  10 mL Intravenous Q12H Gunner Benson MD   10 mL at 01/05/20 2116   • sodium chloride 0.9 % flush 10 mL  10 mL Intravenous PRN Gunner Benson MD       • sucralfate (CARAFATE) 1  GM/10ML suspension 1 g  1 g Oral Q6H Gunner Benson MD   1 g at 01/05/20 2302     Lab Results (last 72 hours)     Procedure Component Value Units Date/Time    Sedimentation Rate [[id number]]  (Normal) Collected:  01/04/20 0506    Specimen:  Blood Updated:  01/04/20 0644     Sed Rate 2 mm/hr     CBC & Differential [[id number]] Collected:  01/04/20 0506    Specimen:  Blood Updated:  01/04/20 0633    Narrative:       The following orders were created for panel order CBC & Differential.  Procedure                               Abnormality         Status                     ---------                               -----------         ------                     Manual Differential[[id number]]          Abnormal            Final result               CBC Auto Differential[[id number]]        Abnormal            Final result                 Please view results for these tests on the individual orders.    Manual Differential [[id number]]  (Abnormal) Collected:  01/04/20 0506    Specimen:  Blood Updated:  01/04/20 0633     Neutrophil % 50.0 %      Lymphocyte % 24.0 %      Monocyte % 11.0 %      Eosinophil % 2.0 %      Bands %  9.0 %      Atypical Lymphocyte % 4.0 %      Neutrophils Absolute 2.38 10*3/mm3      Lymphocytes Absolute 0.97 10*3/mm3      Monocytes Absolute 0.44 10*3/mm3      Eosinophils Absolute 0.08 10*3/mm3      RBC Morphology Normal     WBC Morphology Normal     Giant Platelets Slight/1+    CBC Auto Differential [[id number]]  (Abnormal) Collected:  01/04/20 0506    Specimen:  Blood Updated:  01/04/20 0633     WBC 4.03 10*3/mm3      RBC 4.00 10*6/mm3      Hemoglobin 12.9 g/dL      Hematocrit 36.4 %      MCV 91.0 fL      MCH 32.3 pg      MCHC 35.4 g/dL      RDW 11.3 %      RDW-SD 37.8 fl      MPV 10.6 fL      Platelets 182 10*3/mm3     Comprehensive Metabolic Panel [[id number]]  (Abnormal) Collected:  01/04/20 0506    Specimen:  Blood Updated:  01/04/20 0556     Glucose 114 mg/dL      BUN 9 mg/dL      Creatinine 0.93  mg/dL      Sodium 138 mmol/L      Potassium 4.0 mmol/L      Chloride 103 mmol/L      CO2 28.0 mmol/L      Calcium 8.2 mg/dL      Total Protein 5.8 g/dL      Albumin 3.30 g/dL      ALT (SGPT) 23 U/L      AST (SGOT) 18 U/L      Alkaline Phosphatase 37 U/L      Total Bilirubin 0.4 mg/dL      eGFR Non African Amer 85 mL/min/1.73      Globulin 2.5 gm/dL      A/G Ratio 1.3 g/dL      BUN/Creatinine Ratio 9.7     Anion Gap 7.0 mmol/L     Narrative:       GFR Normal >60  Chronic Kidney Disease <60  Kidney Failure <15      [place] Draw [[id number]] Collected:  01/03/20 1421    Specimen:  Blood Updated:  01/03/20 1530    Narrative:       The following orders were created for panel order [place] Draw.  Procedure                               Abnormality         Status                     ---------                               -----------         ------                     Light Blue Top[[id number]]                                   Final result               Green Top (Gel)[[id number]]                                  Final result               Lavender Top[[id number]]                                     Final result               Red Top[[id number]]                                          Final result                 Please view results for these tests on the individual orders.    Light Blue Top [[id number]] Collected:  01/03/20 1421    Specimen:  Blood Updated:  01/03/20 1530     Extra Tube hold for add-on     Comment: Auto resulted       Green Top (Gel) [[id number]] Collected:  01/03/20 1421    Specimen:  Blood Updated:  01/03/20 1530     Extra Tube Hold for add-ons.     Comment: Auto resulted.       Lavender Top [[id number]] Collected:  01/03/20 1421    Specimen:  Blood Updated:  01/03/20 1530     Extra Tube hold for add-on     Comment: Auto resulted       Red Top [[id number]] Collected:  01/03/20 1421    Specimen:  Blood Updated:  01/03/20 1530     Extra Tube Hold for add-ons.     Comment: Auto resulted.       Protime-INR  [[id number]]  (Normal) Collected:  01/03/20 1421    Specimen:  Blood Updated:  01/03/20 1522     Protime 12.7 Seconds      INR 0.93    aPTT [[id number]]  (Normal) Collected:  01/03/20 1421    Specimen:  Blood Updated:  01/03/20 1522     PTT 29.7 seconds     Lactic Acid, Plasma [[id number]]  (Normal) Collected:  01/03/20 1456    Specimen:  Blood from Arm, Right Updated:  01/03/20 1518     Lactate 0.7 mmol/L     Lipase [[id number]]  (Normal) Collected:  01/03/20 1421    Specimen:  Blood Updated:  01/03/20 1457     Lipase 19 U/L     Comprehensive Metabolic Panel [[id number]]  (Abnormal) Collected:  01/03/20 1421    Specimen:  Blood Updated:  01/03/20 1451     Glucose 120 mg/dL      BUN 13 mg/dL      Creatinine 0.73 mg/dL      Sodium 138 mmol/L      Potassium 3.9 mmol/L      Chloride 102 mmol/L      CO2 23.0 mmol/L      Calcium 9.0 mg/dL      Total Protein 6.9 g/dL      Albumin 4.30 g/dL      ALT (SGPT) 25 U/L      AST (SGOT) 28 U/L      Alkaline Phosphatase 45 U/L      Total Bilirubin 0.6 mg/dL      eGFR Non African Amer 113 mL/min/1.73      Globulin 2.6 gm/dL      A/G Ratio 1.7 g/dL      BUN/Creatinine Ratio 17.8     Anion Gap 13.0 mmol/L     Narrative:       GFR Normal >60  Chronic Kidney Disease <60  Kidney Failure <15      CBC & Differential [[id number]] Collected:  01/03/20 1421    Specimen:  Blood Updated:  01/03/20 1450    Narrative:       The following orders were created for panel order CBC & Differential.  Procedure                               Abnormality         Status                     ---------                               -----------         ------                     CBC Auto Differential[[id number]]        Abnormal            Final result                 Please view results for these tests on the individual orders.    CBC Auto Differential [[id number]]  (Abnormal) Collected:  01/03/20 1421    Specimen:  Blood Updated:  01/03/20 1450     WBC 4.42 10*3/mm3      RBC 4.61 10*6/mm3      Hemoglobin 14.8 g/dL       Hematocrit 40.7 %      MCV 88.3 fL      MCH 32.1 pg      MCHC 36.4 g/dL      RDW 11.4 %      RDW-SD 36.6 fl      MPV 10.6 fL      Platelets 194 10*3/mm3      Neutrophil % 63.8 %      Lymphocyte % 19.9 %      Monocyte % 14.0 %      Eosinophil % 1.6 %      Basophil % 0.5 %      Immature Grans % 0.2 %      Neutrophils, Absolute 2.82 10*3/mm3      Lymphocytes, Absolute 0.88 10*3/mm3      Monocytes, Absolute 0.62 10*3/mm3      Eosinophils, Absolute 0.07 10*3/mm3      Basophils, Absolute 0.02 10*3/mm3      Immature Grans, Absolute 0.01 10*3/mm3      nRBC 0.0 /100 WBC           Imaging Results (Last 7 Days)     Procedure Component Value Units Date/Time    XR Abdomen Flat & Upright [[id number]] Collected:  01/05/20 0824     Updated:  01/05/20 0830    Narrative:       HISTORY: Small bowel obstruction     ABDOMEN: Supine and upright views the abdomen are obtained.     COMPARISON: 04/20/2020     FINDINGS: There is decreasing gaseous distention of the central small  bowel loops. There are air-fluid levels contained within the nondilated  small and large bowel. Small volume of retained contrast within the  rectum. Left pelvic phlebolith. No organomegaly. Mild degenerative  changes lower lumbar spine. Mild elevation right hemidiaphragm.       Impression:       1. Decreasing gaseous distention of the central small bowel loops with  air-fluid levels present in the nondilated small and large bowel with  today's exam. No pneumatosis.  This report was finalized on 01/05/2020 08:27 by Dr. Jasmina Washington MD.    FL Small Bowel Follow Through Single-Contrast [[id number]] Collected:  01/04/20 1645     Updated:  01/04/20 1651    Narrative:       EXAMINATION:  FL SMALL BOWEL FOLLOW THROUGH SINGLE-CONTRAST-  1/4/2020  2:00 PM CST     HISTORY: Patient without previous abdominal surgeries reported to have  several areas of concern in the small bowel please evaluate with a small  bowel follow-through today; R10.30-Lower abdominal pain,  unspecified;  K56.600-Partial intestinal obstruction, unspecified as to cause      COMPARISON: Abdominal radiograph performed earlier today and abdomen  pelvis CT dated 01/03/2020     TECHNIQUE:      Image number: [id number].     Gastrografin was placed through an existing NG tube. Sequential KUBs  obtained.     FINDINGS:      There is contrast material identified throughout the colon including the  rectum at 2 hours and 15 minutes.     There are prominent small bowel loops centrally and proximally observed  on the earlier radiographs with decompressed small bowel loops on  delayed imaging, distally. A mild partial small bowel obstruction  considered. Correlate with patient presentation.       Impression:       1. Mildly dilated proximal small bowel with decompressed distal small  bowel and colon with contrast material observed throughout the colon at  2 hours and 15 minutes. Partial proximal small bowel obstruction  considered.  This report was finalized on 01/04/2020 16:48 by Dr. Scotty Russell MD.    XR Abdomen KUB [[id number]] Collected:  01/04/20 1027     Updated:  01/04/20 1032    Narrative:       EXAMINATION:  XR ABDOMEN KUB-  1/4/2020 10:21 AM CST     HISTORY: KUB flat and upright on Saturday; R10.30-Lower abdominal pain,  unspecified; K56.600-Partial intestinal obstruction, unspecified as to  cause      COMPARISON: 01/03/2020 KUB and abdomen pelvis CT dated 01/03/2020     TECHNIQUE: Single view: KUB     FINDINGS:      Mild gaseous distended small bowel loops observed centrally with gas in  the nondistended colon there is noted similarly on the previous KUB.       Impression:       1. Persistent Mild gaseous distention of small bowel loops centrally.  This report was finalized on 01/04/2020 10:29 by Dr. Scotty Russell MD.    CT Abdomen Pelvis With Contrast [[id number]] Collected:  01/03/20 1537     Updated:  01/03/20 1554    Narrative:       CT ABDOMEN PELVIS W CONTRAST-     Indication: Abdominal pain     Comparison:  None     DOSE LENGTH PRODUCT: 409 mGy cm. Automated exposure control was also  utilized to decrease patient radiation dose.     Findings:     Lung bases are clear.      No suspicious liver lesion. The gallbladder and biliary tree are normal.  Pancreas, spleen, and adrenal glands are unremarkable. No solid renal  lesion. No urolithiasis or hydronephrosis.      Sigmoid diverticulosis without evidence of diverticulitis. No colonic  wall thickening or inflammation. Normal appendix.      The stomach and duodenum appear unremarkable. Multiple loops of mid and  distal small bowel are dilated and measure up to 3.4 cm in diameter. 2  areas of small bowel stricturing/narrowing in the right lower quadrant  on coronal image 20 and 22 are present and small bowel distal to these  regions is decompressed. There are also focal areas of small bowel  narrowing more proximally on image 22 and on image 20.     No ascites or free pelvic fluid. No pelvic mass organized pelvic  collection. Abdominal aorta is normal in caliber. SMA, portal vein, and  SMV are widely patent. No enlarged abdominal or pelvic lymph nodes. No  suspicious focal bone lesion. Multilevel degenerative change of the  lumbar spine, greatest at L4-L5.       Impression:          Partial small bowel obstruction with multiple areas of focal  narrowing/stricturing in the mid to distal small bowel with dominant  transition point in the right lower quadrant. No obstructing mass  identified. Differential diagnosis includes partial obstruction  secondary to multiple adhesions. Recommend correlation with medication  use given that appearance could also represent NSAID related diaphragm  disease.  This report was finalized on 01/03/2020 15:50 by Dr. Tavo Medrano MD.        Orders (last 72 hrs)      Start     Ordered    01/05/20 1059  Diet Regular  Diet Effective Now      01/05/20 1059    01/05/20 0530  XR Abdomen Flat & Upright  1 Time Imaging      01/05/20 0532    01/05/20  0000  XR Abdomen KUB  1 Time Imaging,   Status:  Canceled      01/04/20 2254    01/04/20 2255  Nasograstric Tube Discontinue  Once      01/04/20 2254    01/04/20 2255  Diet Clear Liquid  Diet Effective Now,   Status:  Canceled      01/04/20 2254    01/04/20 1600  ceFOXitin (MEFOXIN) 1 g/100 mL NS (MBP)  Every 6 Hours      01/04/20 1332    01/04/20 1430  diatrizoate meglumine-sodium (GASTROGRAFIN) 66-10 % solution 360 mL  Once in Imaging      01/04/20 1514    01/04/20 1328  FL Small Bowel Follow Through Single-Contrast  1 Time Imaging      01/04/20 1328    01/04/20 0600  CBC & Differential  Morning Draw      01/03/20 1726    01/04/20 0600  Comprehensive Metabolic Panel  Morning Draw      01/03/20 1726    01/04/20 0600  Sedimentation Rate  Morning Draw      01/03/20 1726    01/04/20 0600  Manual Differential  PROCEDURE ONCE      01/04/20 0001    01/04/20 0600  CBC Auto Differential  PROCEDURE ONCE      01/04/20 0001    01/04/20 0000  XR Abdomen KUB  1 Time Imaging      01/03/20 1726    01/03/20 2130  enoxaparin (LOVENOX) syringe 30 mg  Every 12 Hours Scheduled      01/03/20 1726    01/03/20 2100  sodium chloride 0.9 % flush 10 mL  Every 12 Hours Scheduled      01/03/20 1726    01/03/20 2035  Opioid Administration - Continuous Pulse Oximetry (SpO2) Monitoring  Continuous      01/03/20 2034 01/03/20 2035  Opioid Administration - Document SpO2 Value With Each Set of Vitals & Any Change in Patient Status  Continuous      01/03/20 2034 01/03/20 2035  Opioid Administration - Notify Provider Pulse Oximetry (SpO2)  Until Discontinued      01/03/20 2034 01/03/20 2000  Vital Signs  Every 4 Hours      01/03/20 1726    01/03/20 1900  ceFOXitin (MEFOXIN) 1 g in sodium chloride 0.9 % 100 mL IVPB  Every 6 Hours,   Status:  Discontinued      01/03/20 1805    01/03/20 1800  Incentive Spirometry  Every 4 Hours While Awake      01/03/20 1726    01/03/20 1800  Incentive Spirometry  Every Hour      01/03/20 1726    01/03/20 1800   metroNIDAZOLE (FLAGYL) 500 mg/100mL IVPB  Every 6 Hours      01/03/20 1726    01/03/20 1800  sucralfate (CARAFATE) 1 GM/10ML suspension 1 g  Every 6 Hours Scheduled      01/03/20 1726    01/03/20 1727  lactated ringers bolus 1,000 mL  Once      01/03/20 1726    01/03/20 1727  dextrose 5 % and sodium chloride 0.45 % with KCl 20 mEq/L infusion  Continuous      01/03/20 1726    01/03/20 1727  ceFOXitin (MEFOXIN) IVPB 1 g/50mL dextrose (premix)  Every 6 Hours,   Status:  Discontinued      01/03/20 1726    01/03/20 1726  Please give the bolus over the next 2 hours, all of this so IV fluids, KUB in the morning.  Thank you  Nursing Communication  Until Discontinued     Comments:  Please give the bolus over the next 2 hours, all of this so IV fluids, KUB in the morning.  Thank you    01/03/20 1726    01/03/20 1725  morphine injection 4 mg  Every 1 Hour PRN      01/03/20 1726    01/03/20 1725  ondansetron (ZOFRAN) 8 mg/50 mL NS IVPB  Every 6 Hours PRN      01/03/20 1726    01/03/20 1725  ondansetron ODT (ZOFRAN-ODT) disintegrating tablet 8 mg  Every 6 Hours PRN      01/03/20 1726    01/03/20 1725  simethicone (MYLICON) chewable tablet 80 mg  4 Times Daily PRN      01/03/20 1726    01/03/20 1723  Place Sequential Compression Device  Once      01/03/20 1726    01/03/20 1723  Maintain Sequential Compression Device  Continuous      01/03/20 1726    01/03/20 1723  Activity - Ad Adelita  Until Discontinued      01/03/20 1726    01/03/20 1723  NPO Diet  Diet Effective Now,   Status:  Canceled      01/03/20 1726    01/03/20 1722  Code Status and Medical Interventions:  Continuous      01/03/20 1726    01/03/20 1722  Intake & Output  Every Shift      01/03/20 1726    01/03/20 1722  Weigh Patient  Once      01/03/20 1726    01/03/20 1722  Insert Peripheral IV  Once      01/03/20 1726    01/03/20 1722  Saline Lock & Maintain IV Access  Continuous      01/03/20 1726    01/03/20 1722  Initiate Observation Status  Once      01/03/20 1726     01/03/20 1721  sodium chloride 0.9 % flush 10 mL  As Needed      01/03/20 1726    01/03/20 1525  iopamidol (ISOVUE-300) 61 % injection 100 mL  Once in Imaging      01/03/20 1523    01/03/20 1450  CBC Auto Differential  Once      01/03/20 1449    01/03/20 1447  Protime-INR  Once      01/03/20 1446    01/03/20 1447  Lipase  Once      01/03/20 1446    01/03/20 1447  aPTT  Once      01/03/20 1446    01/03/20 1447  Lactic Acid, Plasma  Once      01/03/20 1446    01/03/20 1447  CT Abdomen Pelvis With Contrast  1 Time Imaging     Comments:  IV CONTRAST ONLY      01/03/20 1446    01/03/20 1435  CBC & Differential  Once      01/03/20 1435    01/03/20 1435  Comprehensive Metabolic Panel  Once      01/03/20 1435    01/03/20 1414  [place] Draw  Once      01/03/20 1413    01/03/20 1414  Light Blue Top  PROCEDURE ONCE      01/03/20 1413    01/03/20 1414  Green Top (Gel)  PROCEDURE ONCE      01/03/20 1413    01/03/20 1414  Lavender Top  PROCEDURE ONCE      01/03/20 1413    01/03/20 1414  Red Top  PROCEDURE ONCE      01/03/20 1413    01/03/20 0000  dicyclomine (BENTYL) 20 MG tablet  3 Times Daily      01/04/20 1747    --  lisinopril (PRINIVIL,ZESTRIL) 20 MG tablet  Daily      01/03/20 1813    --  ondansetron ODT (ZOFRAN-ODT) 4 MG disintegrating tablet  Every 8 Hours PRN      01/04/20 1747    --  tadalafil (ADCIRCA) 20 MG tablet tablet  Every 24 Hours Scheduled      01/04/20 1747    --  tamsulosin (FLOMAX) 0.4 MG capsule 24 hr capsule  Daily      01/04/20 1747                Operative/Procedure Notes (all)    No notes of this type exist for this encounter.            Physician Progress Notes (last 48 hours) (Notes from 01/04/20 0647 through 01/06/20 0647)      Gunner Benson MD at 01/05/20 1055               LOS: 0 days   Patient Care Team:  Provider, No Known as PCP - General    Chief Complaint: Hospital day #2 for gastroenteritis with ileus  Subjective     Subjective   Hospital day #2 for treatment of gastroenteritis with  ileus, he had 4 bowel movements after the Gastrografin enema yesterday he has had one bowel movement this morning he is not nauseated, he is back to his normal size of his abdomen to me it seems still slightly distended, bowel sounds are present, slightly hypoactive.  Gastrografin shows passage into the colon by 2-1/2 hours there is some areas of narrowing but no obvious masses noted I think I will follow him up with a enteroclysis CT scan in 3 weeks to assure no problem areas  Objective      Objective     Vital Signs  Temp:  [98 °F (36.7 °C)-98.4 °F (36.9 °C)] 98.4 °F (36.9 °C)  Heart Rate:  [66-73] 71  Resp:  [14-16] 16  BP: (110-118)/(73-78) 110/73    Intake & Output (last 3 days)       01/02 0701 - 01/03 0700 01/03 0701 - 01/04 0700 01/04 0701 - 01/05 0700 01/05 0701 - 01/06 0700    I.V. (mL/kg)   3934 (39.7)     Total Intake(mL/kg)   3934 (39.7)     Urine (mL/kg/hr)  800 600 (0.3)     Stool  0 0     Total Output  800 600     Net  -800 +3334             Urine Unmeasured Occurrence   3 x     Stool Unmeasured Occurrence  1 x 1 x           Physical Exam:     General Appearance:    Alert, cooperative, in no acute distress   Lungs:     Clear to auscultation,respirations regular, even and                  unlabored    Heart:    Regular rhythm and normal rate, normal S1 and S2, no            murmur, no gallop, no rub, no click   Chest Wall:    No abnormalities observed   Abdomen:    Occasional bowel sounds, no masses, nontender, white count is reduced to 4, electrolytes within normal limits.  Sed rate is 2.  Differential shows 50% neutrophils 9 bands 24 lymphocytes and 4 atypical.        Results Review:     I reviewed the patient's new clinical results.  I reviewed the patient's new imaging results and agree with the interpretation.    Results from last 7 days   Lab Units 01/04/20  0506 01/03/20  1421   WBC 10*3/mm3 4.03 4.42   HEMOGLOBIN g/dL 12.9* 14.8   HEMATOCRIT % 36.4* 40.7   PLATELETS 10*3/mm3 182 194         Results from last 7 days   Lab Units 01/04/20  0506 01/03/20  1421   SODIUM mmol/L 138 138   POTASSIUM mmol/L 4.0 3.9   CHLORIDE mmol/L 103 102   CO2 mmol/L 28.0 23.0   BUN mg/dL 9 13   CREATININE mg/dL 0.93 0.73*   CALCIUM mg/dL 8.2* 9.0   BILIRUBIN mg/dL 0.4 0.6   ALK PHOS U/L 37* 45   ALT (SGPT) U/L 23 25   AST (SGOT) U/L 18 28   GLUCOSE mg/dL 114* 120*       Assessment/Plan     Assessment/Plan       Lower abdominal pain      Patient with a viral gastroenteritis no obstruction by small bowel follow-through plan to advance him to a regular diet continue to increase his activity look toward discharging on Monday and then have him set up for a enteroclysis study in 3 weeks to assure no colonic problems.    Or small bowel problems  Gunner Benson MD  01/05/20  10:55 AM      Time: time spent with patient 15 minutes     EMR Dragon/Transcription disclaimer: Much of this encounter note is an electronic transcription/translation of spoken language to printed text. The electronic translation of spoken language may permit erroneous, or at times, nonsensical words or phrases to be inadvertently transcribed; although I have reviewed the note for such errors, some may still exist.    Electronically signed by Gunner Benson MD at 01/05/20 1058     Gunner Benson MD at 01/04/20 1325               LOS: 0 days   Patient Care Team:  Provider, No Known as PCP - General    Chief Complaint: Small bowel obstruction  Subjective     Subjective     Patient admitted 1 day prior with abdominal distention no previous surgical interventions noted, with this appreciated is presumed that he has a viral gastroenteritis but with a note several areas of small bowel that were narrowed I think this is probably viral gastroenteritis but will need to disprove with a Gastrografin small bowel follow-through.  Objective      Objective     Vital Signs  Temp:  [97.8 °F (36.6 °C)-98.5 °F (36.9 °C)] 98.5 °F (36.9 °C)  Heart Rate:  [66-87]  66  Resp:  [14-16] 16  BP: ()/(45-75) 106/64    Intake & Output (last 3 days)       01/01 0701 - 01/02 0700 01/02 0701 - 01/03 0700 01/03 0701 - 01/04 0700 01/04 0701 - 01/05 0700    I.V. (mL/kg)    1383 (14)    Total Intake(mL/kg)    1383 (14)    Urine (mL/kg/hr)   800 600 (0.9)    Stool   0     Total Output   800 600    Net   -800 +783            Stool Unmeasured Occurrence   1 x           Physical Exam:     General Appearance:    Alert, cooperative, in no acute distress   Lungs:     Clear to auscultation,respirations regular, even and                  unlabored    Heart:    Regular rhythm and normal rate, normal S1 and S2, no            murmur, no gallop, no rub, no click   Chest Wall:    No abnormalities observed   Abdomen:    Occasional bowel sounds, slightly less distended, there is still some small bowel distention also: Gas present, not fully obstructed and in fact probably not at all white count is unchanged at 4 with 50% neutrophils 9 bands 24 lymphs.  ESR of 2.        Results Review:     I reviewed the patient's new clinical results.  I reviewed the patient's new imaging results and agree with the interpretation.    Results from last 7 days   Lab Units 01/04/20  0506 01/03/20  1421   WBC 10*3/mm3 4.03 4.42   HEMOGLOBIN g/dL 12.9* 14.8   HEMATOCRIT % 36.4* 40.7   PLATELETS 10*3/mm3 182 194        Results from last 7 days   Lab Units 01/04/20  0506 01/03/20  1421   SODIUM mmol/L 138 138   POTASSIUM mmol/L 4.0 3.9   CHLORIDE mmol/L 103 102   CO2 mmol/L 28.0 23.0   BUN mg/dL 9 13   CREATININE mg/dL 0.93 0.73*   CALCIUM mg/dL 8.2* 9.0   BILIRUBIN mg/dL 0.4 0.6   ALK PHOS U/L 37* 45   ALT (SGPT) U/L 23 25   AST (SGOT) U/L 18 28   GLUCOSE mg/dL 114* 120*       Assessment/Plan     Assessment/Plan       Lower abdominal pain      Currently plan a small bowel follow-through to assure no obstruction in this patient without previous abdominal surgeries once this is completed and I suspect this will be normal we  can look toward beginning diet and discharging him Sunday or Monday.      Gunner Benson MD  01/04/20  1:25 PM      Time: time spent with patient 15 minutes     EMR Dragon/Transcription disclaimer: Much of this encounter note is an electronic transcription/translation of spoken language to printed text. The electronic translation of spoken language may permit erroneous, or at times, nonsensical words or phrases to be inadvertently transcribed; although I have reviewed the note for such errors, some may still exist.    Electronically signed by Gunner Benson MD at 01/04/20 1327       Consult Notes (last 72 hours) (Notes from 01/03/20 0647 through 01/06/20 0647)    No notes of this type exist for this encounter.       ED to Hosp-Admission   1/3/2020   Jair Spencer   MRN: [id number]   All Administrations of morphine injection 4 mg     (suggestion)  The administrations shown are only for this specific order and not for other orders for the same medication that may be in this encounter.   Administration Action Time Recorded Time Documented By Site Comment Reason   Given : 4 mg :   : Intravenous 01/03/20 2316 01/03/20 2316 Margareth Aguilar, RN      Given : 4 mg :   : Intravenous 01/03/20 2033 01/03/20 2034 Margareth Aguilar, RN

## 2020-01-06 NOTE — DISCHARGE SUMMARY
Date of Discharge:  1/6/2020    Discharge Diagnosis: Probable viral gastroenteritis  Presenting Problem/History of Present Illness      Jair Spencer  is a 54 y.o. male presents with a four-day history of progressive abdominal distention he had history of central abdominal pain, cramping, diarrhea 4 days prior with 4-6 loose yellow bowel movements noted.  Over the next 4 days he has had some distention he had some nausea and abdominal fullness with this he presented to the emergency room showing small bowel and large bowel distention his white count is 4 with elevated monocytes probable viral syndrome.  He had a CT scan completed showing some small bowel distention he has had no previous surgery and with this abdominal distention and no bowel movements he is admitted for care this is probable viral gastroenteritis but we need to assure there is no bowel obstruction I do not appreciate any enlarged adenopathy no history of any surgery I doubt this is a surgical bowel obstruction but will admit and monitor.     Past surgical history significant for back surgery in the past on his lumbar spine, also upper endoscopies at least 2 and also a colonoscopy approximately 10 years prior.  Also melanoma excision from the right upper abdomen.     Medical problems significant for hypertension, peptic ulcer disease secondary to NSAIDs,     He is a manager for [employer].     Medications lisinopril, tamsulosin.     Non-smoker minimal drinking 1 sixpack per week.     No known drug allergies.         Patient was admitted with this probable viral gastroenteritis he was monitored overnight he did much better he was advanced on some clear liquids we completed a small bowel follow-through showing some slow sluggish flow through the small bowel but there was no total obstruction it was in the colon by 2-1/2 hours on his initial CAT scan there reading some narrowed areas in the small bowel some stenosis questionable he had no problems until  recently his white count was always less than 4 and he did not have any inflammatory reaction ESR is normal it is presumed that he had a viral gastroenteritis he had a lymphocytic shift and currently I plan to advance him to a regular diet he will be discharged we will follow-up in a CT enterography in 3 weeks to assure no small bowel lesions I will see him back in 1 month and if there is no problems no other suggestions other than a regular colonoscopy that will be scheduled for him when I see him back in 1 month.  He also has a melanoma on his back that he is seeing a surgeon for this will be addressed and treated in the next 2 weeks as well    Procedures Performed         Consults:   Consults     No orders found from 12/5/2019 to 1/4/2020.          Pertinent Test Results:   Lab Results (last 24 hours)     ** No results found for the last 24 hours. **            Condition on Discharge: Okay with me for discharge    Discharge Disposition follow-up with Dr. Benson in 1 month in 3 weeks have a CT enterography to assure no small bowel problems    Discharge Medications     Discharge Medications      ASK your doctor about these medications      Instructions Start Date   dicyclomine 20 MG tablet  Commonly known as:  BENTYL   20 mg, Oral, 3 Times Daily      lisinopril 20 MG tablet  Commonly known as:  PRINIVIL,ZESTRIL   20 mg, Oral, Daily      ondansetron ODT 4 MG disintegrating tablet  Commonly known as:  ZOFRAN-ODT   4 mg, Oral, Every 8 Hours PRN      tadalafil 20 MG tablet tablet  Commonly known as:  ADCIRCA   40 mg, Oral, Every 24 Hours Scheduled      tamsulosin 0.4 MG capsule 24 hr capsule  Commonly known as:  FLOMAX   1 capsule, Oral, Daily             Discharge Diet: Regular diet    Activity at Discharge: No Limitations on activities    Follow-up Appointments he will have a CT scan scheduled before he leaves he will have a CT enterography to evaluate the small bowel in 3 weeks he will follow-up with Dr. Benson in  1 month      Test Results Pending at Discharge       Gunner Benson MD  01/06/20  4:28 PM    Time: Time spent at discharge 30 minutes     EMR Dragon/Transcription disclaimer: Much of this encounter note is an electronic transcription/translation of spoken language to printed text. The electronic translation of spoken language may permit erroneous, or at times, nonsensical words or phrases to be inadvertently transcribed; although I have reviewed the note for such errors, some may still exist.

## 2020-01-06 NOTE — PLAN OF CARE
Patient tolerating diet with out any pain. Wanting  to go home today when doctors round    Problem: Patient Care Overview  Goal: Plan of Care Review  Outcome: Ongoing (interventions implemented as appropriate)  Goal: Individualization and Mutuality  Outcome: Ongoing (interventions implemented as appropriate)  Goal: Discharge Needs Assessment  Outcome: Ongoing (interventions implemented as appropriate)  Goal: Interprofessional Rounds/Family Conf  Outcome: Ongoing (interventions implemented as appropriate)     Problem: Bowel Obstruction (Adult)  Goal: Signs and Symptoms of Listed Potential Problems Will be Absent, Minimized or Managed (Bowel Obstruction)  Outcome: Ongoing (interventions implemented as appropriate)

## 2020-01-07 NOTE — PAYOR COMM NOTE
"DC HOME 1-6-20    [id number]  UR PHONE    [phone]  Jair Paulino (54 y.o. Male)     Date of Birth Social Security Number Address Home Phone MRN    1965  [address] [facility] [address] [phone] [id number]    Sabianist Marital Status          Other        Admission Date Admission Type Admitting Provider Attending Provider Department, Room/Bed    1/3/20 Emergency Gunner Benson MD  [facility] 3C, 393/1    Discharge Date Discharge Disposition Discharge Destination        1/6/2020 Home or Self Care              Attending Provider:  (none)   Allergies:  No Known Allergies    Isolation:  None   Infection:  None   Code Status:  Prior    Ht:  180.3 cm (71\")   Wt:  99 kg (218 lb 3.2 oz)    Admission Cmt:  None   Principal Problem:  None                Active Insurance as of 1/3/2020     Primary Coverage     Payor Plan Insurance Group Employer/Plan Group    ANTHEM BLUE CROSS ANTHEM BLUE CROSS BLUE SHIELD PPO [id number]     Payor Plan Address Payor Plan Phone Number Payor Plan Fax Number Effective Dates    [address] [phone]  1/1/2019 - None Entered    [facility] [address]       Subscriber Name Subscriber Birth Date Member ID       JAIR PAULINO 1965 [id number]                 Emergency Contacts      (Rel.) Home Phone Work Phone Mobile Phone    sangeeta paulino (Spouse) -- -- [phone]            Operative/Procedure Notes (all)    No notes of this type exist for this encounter.         Physician Progress Notes (last 24 hours) (Notes from 01/06/20 0925 through 01/07/20 0925)    No notes of this type exist for this encounter.         Consult Notes (last 24 hours) (Notes from 01/06/20 0925 through 01/07/20 0925)    No notes of this type exist for this encounter.            Discharge Summary      Gunner Benson MD at 01/06/20 3161              Date of Discharge:  1/6/2020    Discharge Diagnosis: Probable viral gastroenteritis  Presenting " Problem/History of Present Illness      Jair Spencer  is a 54 y.o. male presents with a four-day history of progressive abdominal distention he had history of central abdominal pain, cramping, diarrhea 4 days prior with 4-6 loose yellow bowel movements noted.  Over the next 4 days he has had some distention he had some nausea and abdominal fullness with this he presented to the emergency room showing small bowel and large bowel distention his white count is 4 with elevated monocytes probable viral syndrome.  He had a CT scan completed showing some small bowel distention he has had no previous surgery and with this abdominal distention and no bowel movements he is admitted for care this is probable viral gastroenteritis but we need to assure there is no bowel obstruction I do not appreciate any enlarged adenopathy no history of any surgery I doubt this is a surgical bowel obstruction but will admit and monitor.     Past surgical history significant for back surgery in the past on his lumbar spine, also upper endoscopies at least 2 and also a colonoscopy approximately 10 years prior.  Also melanoma excision from the right upper abdomen.     Medical problems significant for hypertension, peptic ulcer disease secondary to NSAIDs,     He is a manager for [employer].     Medications lisinopril, tamsulosin.     Non-smoker minimal drinking 1 sixpack per week.     No known drug allergies.         Patient was admitted with this probable viral gastroenteritis he was monitored overnight he did much better he was advanced on some clear liquids we completed a small bowel follow-through showing some slow sluggish flow through the small bowel but there was no total obstruction it was in the colon by 2-1/2 hours on his initial CAT scan there reading some narrowed areas in the small bowel some stenosis questionable he had no problems until recently his white count was always less than 4 and he did not have any inflammatory reaction ESR is  normal it is presumed that he had a viral gastroenteritis he had a lymphocytic shift and currently I plan to advance him to a regular diet he will be discharged we will follow-up in a CT enterography in 3 weeks to assure no small bowel lesions I will see him back in 1 month and if there is no problems no other suggestions other than a regular colonoscopy that will be scheduled for him when I see him back in 1 month.  He also has a melanoma on his back that he is seeing a surgeon for this will be addressed and treated in the next 2 weeks as well    Procedures Performed         Consults:   Consults     No orders found from 12/5/2019 to 1/4/2020.          Pertinent Test Results:   Lab Results (last 24 hours)     ** No results found for the last 24 hours. **            Condition on Discharge: Okay with me for discharge    Discharge Disposition follow-up with Dr. Benson in 1 month in 3 weeks have a CT enterography to assure no small bowel problems    Discharge Medications     Discharge Medications      ASK your doctor about these medications      Instructions Start Date   dicyclomine 20 MG tablet  Commonly known as:  BENTYL   20 mg, Oral, 3 Times Daily      lisinopril 20 MG tablet  Commonly known as:  PRINIVIL,ZESTRIL   20 mg, Oral, Daily      ondansetron ODT 4 MG disintegrating tablet  Commonly known as:  ZOFRAN-ODT   4 mg, Oral, Every 8 Hours PRN      tadalafil 20 MG tablet tablet  Commonly known as:  ADCIRCA   40 mg, Oral, Every 24 Hours Scheduled      tamsulosin 0.4 MG capsule 24 hr capsule  Commonly known as:  FLOMAX   1 capsule, Oral, Daily             Discharge Diet: Regular diet    Activity at Discharge: No Limitations on activities    Follow-up Appointments he will have a CT scan scheduled before he leaves he will have a CT enterography to evaluate the small bowel in 3 weeks he will follow-up with Dr. Benson in 1 month      Test Results Pending at Discharge       Gunner Benson MD  01/06/20  4:28  PM    Time: Time spent at discharge 30 minutes     EMR Dragon/Transcription disclaimer: Much of this encounter note is an electronic transcription/translation of spoken language to printed text. The electronic translation of spoken language may permit erroneous, or at times, nonsensical words or phrases to be inadvertently transcribed; although I have reviewed the note for such errors, some may still exist.          Electronically signed by Gunner Benson MD at 01/06/20 1277

## 2020-01-27 ENCOUNTER — HOSPITAL ENCOUNTER (OUTPATIENT)
Dept: CT IMAGING | Facility: HOSPITAL | Age: 55
Discharge: HOME | End: 2020-01-27
Admitting: SPECIALIST

## 2020-01-27 DIAGNOSIS — K56.600 PARTIAL SMALL BOWEL OBSTRUCTION (HCC): ICD-10-CM

## 2020-01-27 LAB — CREAT BLDA-MCNC: 1 MG/DL (ref 0.6–1.3)

## 2020-01-27 PROCEDURE — 82565 ASSAY OF CREATININE: CPT

## 2020-01-27 PROCEDURE — 25010000002 IOPAMIDOL 61 % SOLUTION: Performed by: SPECIALIST

## 2020-01-27 PROCEDURE — 74177 CT ABD & PELVIS W/CONTRAST: CPT

## 2020-01-27 RX ADMIN — IOPAMIDOL 150 ML: 612 INJECTION, SOLUTION INTRAVENOUS at 10:03

## 2020-01-27 RX ADMIN — Medication 237 ML: at 10:08

## 2020-02-28 RX ORDER — TADALAFIL 5 MG/1
5 TABLET ORAL DAILY PRN
COMMUNITY

## 2020-03-11 ENCOUNTER — OFFICE VISIT (OUTPATIENT)
Dept: GASTROENTEROLOGY | Facility: CLINIC | Age: 55
End: 2020-03-11

## 2020-03-11 VITALS
TEMPERATURE: 97.8 F | DIASTOLIC BLOOD PRESSURE: 82 MMHG | OXYGEN SATURATION: 99 % | HEART RATE: 86 BPM | SYSTOLIC BLOOD PRESSURE: 114 MMHG | BODY MASS INDEX: 30.52 KG/M2 | WEIGHT: 218 LBS | HEIGHT: 71 IN

## 2020-03-11 DIAGNOSIS — Z12.11 ENCOUNTER FOR SCREENING FOR MALIGNANT NEOPLASM OF COLON: Primary | ICD-10-CM

## 2020-03-11 DIAGNOSIS — I10 ESSENTIAL HYPERTENSION: ICD-10-CM

## 2020-03-11 PROCEDURE — S0285 CNSLT BEFORE SCREEN COLONOSC: HCPCS | Performed by: NURSE PRACTITIONER

## 2020-03-11 NOTE — H&P (VIEW-ONLY)
[facility]    Primary Physician Nolan Roe MD    3/11/2020    Jair Spencer   1965      Chief Complaint   Patient presents with   • Colonoscopy       Subjective     HPI    Jair Spencer is a 55 y.o. male who presents as a referral for preventative maintenance. He has no complaints of nausea or vomiting. No change in bowels. No wt loss. No BRBPR. No melena. No abdominal pain.        Last colonoscopy was around 12 year ago.  The patient does not have history of colon polyps. The patient does not  have history of colon cancer.   There is not a family history of colon polyps. There is not a family history of colon cancer.     Past Medical History:   Diagnosis Date   • Gastric ulcer    • Hypertension    • Melanoma (CMS/HCC)        Past Surgical History:   Procedure Laterality Date   • BACK SURGERY     • SKIN CANCER EXCISION     • VASECTOMY         Outpatient Medications Marked as Taking for the 3/11/20 encounter (Office Visit) with Nidia Hameed APRN   Medication Sig Dispense Refill   • lisinopril (PRINIVIL,ZESTRIL) 20 MG tablet Take 20 mg by mouth Daily.     • tadalafil (CIALIS) 5 MG tablet Take 5 mg by mouth Daily As Needed for Erectile Dysfunction.     • tamsulosin (FLOMAX) 0.4 MG capsule 24 hr capsule Take 1 capsule by mouth Daily.         No Known Allergies    Social History     Socioeconomic History   • Marital status:      Spouse name: Not on file   • Number of children: Not on file   • Years of education: Not on file   • Highest education level: Not on file   Tobacco Use   • Smoking status: Never Smoker   • Smokeless tobacco: Never Used   Substance and Sexual Activity   • Alcohol use: Yes     Alcohol/week: 7.0 standard drinks     Types: 7 Standard drinks or equivalent per week     Comment: 1-2 drinks per day   • Drug use: Never   • Sexual activity: Defer       Family History   Problem Relation Age of Onset   • Colon cancer Neg Hx    • Colon polyps Neg Hx            Review of Systems   Constitutional: Negative for appetite change, chills, fatigue, fever and unexpected weight change.   HENT: Negative for sore throat and trouble swallowing.    Eyes: Negative for visual disturbance.   Respiratory: Negative for cough, shortness of breath and wheezing.    Cardiovascular: Negative for chest pain and palpitations.   Gastrointestinal: Negative for abdominal distention, abdominal pain, anal bleeding, blood in stool, constipation, diarrhea, nausea and vomiting.        As mentioned in hpi   Genitourinary: Negative for difficulty urinating and hematuria.   Musculoskeletal: Negative for arthralgias and back pain.   Skin: Negative for color change and rash.   Neurological: Negative for dizziness, seizures, syncope, light-headedness and headaches.   Hematological: Negative for adenopathy.   Psychiatric/Behavioral: Negative for confusion. The patient is not nervous/anxious.        Objective     Vitals:    03/11/20 1455   BP: 114/82   Pulse: 86   Temp: 97.8 °F (36.6 °C)   SpO2: 99%         03/11/20  1455   Weight: 98.9 kg (218 lb)     Body mass index is 30.4 kg/m².    Physical Exam   Constitutional: He appears well-developed and well-nourished. No distress.   HENT:   Head: Normocephalic and atraumatic.   Eyes: EOM are normal. No scleral icterus.   Neck: Neck supple. No JVD present.   Cardiovascular: Normal rate, regular rhythm and normal heart sounds.   Pulmonary/Chest: Effort normal and breath sounds normal.   Abdominal: Soft. Bowel sounds are normal. He exhibits no distension. There is no tenderness.   Musculoskeletal: Normal range of motion. He exhibits no deformity.   Neurological: He is alert.   Skin: Skin is warm and dry. No rash noted.   Psychiatric: He has a normal mood and affect. His behavior is normal.   Vitals reviewed.      Imaging Results (Most Recent)     None          Assessment/Plan     Jair was seen today for colonoscopy.    Diagnoses and all orders for this  visit:    Encounter for screening for malignant neoplasm of colon  -     Case Request; Standing  -     Case Request    Essential hypertension    Other orders  -     Follow Anesthesia Guidelines / Protocol; Future  -     Obtain Informed Consent; Future  -     Sod Picosulfate-Mag Ox-Cit Acd (CLENPIQ) 10-3.5-12 MG-GM -GM/160ML solution; Take 2 bottles by mouth 1 (One) Time for 1 dose. Take as directed              Plan for colonoscopy. The patient was advised to take any blood pressure or heart  medications the morning of  procedure if that is when he/she normally takes.             Body mass index is 30.4 kg/m².    Patient's Body mass index is 30.4 kg/m². BMI is above normal parameters. Recommendations include: no follow up , recommend weight loss .      COLONOSCOPY WITH ANESTHESIA (N/A)  All risks, benefits, alternatives, and indications of colonoscopy procedure have been discussed with the patient. Risks to include perforation of the colon requiring possible surgery or colostomy, risk of bleeding from biopsies or removal of colon tissue, possibility of missing a colon polyp or cancer, or adverse drug reaction.  Benefits to include the diagnosis and management of disease of the colon and rectum. Alternatives to include barium enema, radiographic evaluation, lab testing or no intervention. Pt verbalizes understanding and agrees.         SANTOSH Weber      EMR Dragon/transcription disclaimer:  Much of this encounter note is electronic transcription/translation of spoken language to printed text.  The electronic translation of spoken language may be erroneous, or at times, nonsensical words or phrases may be inadvertently transcribed.  Although I have reviewed the note for such errors, some may still exist.

## 2020-03-11 NOTE — PROGRESS NOTES
[facility]    Primary Physician Nolan Roe MD    3/11/2020    Jair Spencer   1965      Chief Complaint   Patient presents with   • Colonoscopy       Subjective     HPI    Jair Spencer is a 55 y.o. male who presents as a referral for preventative maintenance. He has no complaints of nausea or vomiting. No change in bowels. No wt loss. No BRBPR. No melena. No abdominal pain.        Last colonoscopy was around 12 year ago.  The patient does not have history of colon polyps. The patient does not  have history of colon cancer.   There is not a family history of colon polyps. There is not a family history of colon cancer.     Past Medical History:   Diagnosis Date   • Gastric ulcer    • Hypertension    • Melanoma (CMS/HCC)        Past Surgical History:   Procedure Laterality Date   • BACK SURGERY     • SKIN CANCER EXCISION     • VASECTOMY         Outpatient Medications Marked as Taking for the 3/11/20 encounter (Office Visit) with Nidia Hameed APRN   Medication Sig Dispense Refill   • lisinopril (PRINIVIL,ZESTRIL) 20 MG tablet Take 20 mg by mouth Daily.     • tadalafil (CIALIS) 5 MG tablet Take 5 mg by mouth Daily As Needed for Erectile Dysfunction.     • tamsulosin (FLOMAX) 0.4 MG capsule 24 hr capsule Take 1 capsule by mouth Daily.         No Known Allergies    Social History     Socioeconomic History   • Marital status:      Spouse name: Not on file   • Number of children: Not on file   • Years of education: Not on file   • Highest education level: Not on file   Tobacco Use   • Smoking status: Never Smoker   • Smokeless tobacco: Never Used   Substance and Sexual Activity   • Alcohol use: Yes     Alcohol/week: 7.0 standard drinks     Types: 7 Standard drinks or equivalent per week     Comment: 1-2 drinks per day   • Drug use: Never   • Sexual activity: Defer       Family History   Problem Relation Age of Onset   • Colon cancer Neg Hx    • Colon polyps Neg Hx         Review of Systems   Constitutional: Negative for appetite change, chills, fatigue, fever and unexpected weight change.   HENT: Negative for sore throat and trouble swallowing.    Eyes: Negative for visual disturbance.   Respiratory: Negative for cough, shortness of breath and wheezing.    Cardiovascular: Negative for chest pain and palpitations.   Gastrointestinal: Negative for abdominal distention, abdominal pain, anal bleeding, blood in stool, constipation, diarrhea, nausea and vomiting.        As mentioned in hpi   Genitourinary: Negative for difficulty urinating and hematuria.   Musculoskeletal: Negative for arthralgias and back pain.   Skin: Negative for color change and rash.   Neurological: Negative for dizziness, seizures, syncope, light-headedness and headaches.   Hematological: Negative for adenopathy.   Psychiatric/Behavioral: Negative for confusion. The patient is not nervous/anxious.        Objective     Vitals:    03/11/20 1455   BP: 114/82   Pulse: 86   Temp: 97.8 °F (36.6 °C)   SpO2: 99%         03/11/20  1455   Weight: 98.9 kg (218 lb)     Body mass index is 30.4 kg/m².    Physical Exam   Constitutional: He appears well-developed and well-nourished. No distress.   HENT:   Head: Normocephalic and atraumatic.   Eyes: EOM are normal. No scleral icterus.   Neck: Neck supple. No JVD present.   Cardiovascular: Normal rate, regular rhythm and normal heart sounds.   Pulmonary/Chest: Effort normal and breath sounds normal.   Abdominal: Soft. Bowel sounds are normal. He exhibits no distension. There is no tenderness.   Musculoskeletal: Normal range of motion. He exhibits no deformity.   Neurological: He is alert.   Skin: Skin is warm and dry. No rash noted.   Psychiatric: He has a normal mood and affect. His behavior is normal.   Vitals reviewed.      Imaging Results (Most Recent)     None          Assessment/Plan     Jair was seen today for colonoscopy.    Diagnoses and all orders for this  visit:    Encounter for screening for malignant neoplasm of colon  -     Case Request; Standing  -     Case Request    Essential hypertension    Other orders  -     Follow Anesthesia Guidelines / Protocol; Future  -     Obtain Informed Consent; Future  -     Sod Picosulfate-Mag Ox-Cit Acd (CLENPIQ) 10-3.5-12 MG-GM -GM/160ML solution; Take 2 bottles by mouth 1 (One) Time for 1 dose. Take as directed              Plan for colonoscopy. The patient was advised to take any blood pressure or heart  medications the morning of  procedure if that is when he/she normally takes.             Body mass index is 30.4 kg/m².    Patient's Body mass index is 30.4 kg/m². BMI is above normal parameters. Recommendations include: no follow up , recommend weight loss .      COLONOSCOPY WITH ANESTHESIA (N/A)  All risks, benefits, alternatives, and indications of colonoscopy procedure have been discussed with the patient. Risks to include perforation of the colon requiring possible surgery or colostomy, risk of bleeding from biopsies or removal of colon tissue, possibility of missing a colon polyp or cancer, or adverse drug reaction.  Benefits to include the diagnosis and management of disease of the colon and rectum. Alternatives to include barium enema, radiographic evaluation, lab testing or no intervention. Pt verbalizes understanding and agrees.         SANTOSH Weber      EMR Dragon/transcription disclaimer:  Much of this encounter note is electronic transcription/translation of spoken language to printed text.  The electronic translation of spoken language may be erroneous, or at times, nonsensical words or phrases may be inadvertently transcribed.  Although I have reviewed the note for such errors, some may still exist.

## 2020-03-16 ENCOUNTER — ANESTHESIA (OUTPATIENT)
Dept: GASTROENTEROLOGY | Facility: HOSPITAL | Age: 55
End: 2020-03-16

## 2020-03-16 ENCOUNTER — HOSPITAL ENCOUNTER (OUTPATIENT)
Facility: HOSPITAL | Age: 55
Setting detail: HOSPITAL OUTPATIENT SURGERY
Discharge: HOME | End: 2020-03-16
Attending: INTERNAL MEDICINE | Admitting: INTERNAL MEDICINE

## 2020-03-16 ENCOUNTER — ANESTHESIA EVENT (OUTPATIENT)
Dept: GASTROENTEROLOGY | Facility: HOSPITAL | Age: 55
End: 2020-03-16

## 2020-03-16 VITALS
TEMPERATURE: 97.6 F | OXYGEN SATURATION: 100 % | DIASTOLIC BLOOD PRESSURE: 81 MMHG | RESPIRATION RATE: 19 BRPM | HEART RATE: 65 BPM | WEIGHT: 218 LBS | BODY MASS INDEX: 30.52 KG/M2 | SYSTOLIC BLOOD PRESSURE: 122 MMHG | HEIGHT: 71 IN

## 2020-03-16 DIAGNOSIS — Z12.11 ENCOUNTER FOR SCREENING FOR MALIGNANT NEOPLASM OF COLON: ICD-10-CM

## 2020-03-16 PROCEDURE — 25010000002 PROPOFOL 10 MG/ML EMULSION: Performed by: NURSE ANESTHETIST, CERTIFIED REGISTERED

## 2020-03-16 PROCEDURE — 88305 TISSUE EXAM BY PATHOLOGIST: CPT | Performed by: INTERNAL MEDICINE

## 2020-03-16 PROCEDURE — 45385 COLONOSCOPY W/LESION REMOVAL: CPT | Performed by: INTERNAL MEDICINE

## 2020-03-16 RX ORDER — SODIUM CHLORIDE 0.9 % (FLUSH) 0.9 %
10 SYRINGE (ML) INJECTION EVERY 12 HOURS SCHEDULED
Status: DISCONTINUED | OUTPATIENT
Start: 2020-03-16 | End: 2020-03-16 | Stop reason: HOSPADM

## 2020-03-16 RX ORDER — ONDANSETRON 2 MG/ML
4 INJECTION INTRAMUSCULAR; INTRAVENOUS ONCE AS NEEDED
Status: DISCONTINUED | OUTPATIENT
Start: 2020-03-16 | End: 2020-03-16 | Stop reason: HOSPADM

## 2020-03-16 RX ORDER — SODIUM CHLORIDE 0.9 % (FLUSH) 0.9 %
10 SYRINGE (ML) INJECTION AS NEEDED
Status: DISCONTINUED | OUTPATIENT
Start: 2020-03-16 | End: 2020-03-16 | Stop reason: HOSPADM

## 2020-03-16 RX ORDER — SODIUM CHLORIDE 9 MG/ML
100 INJECTION, SOLUTION INTRAVENOUS CONTINUOUS
Status: DISCONTINUED | OUTPATIENT
Start: 2020-03-16 | End: 2020-03-16 | Stop reason: HOSPADM

## 2020-03-16 RX ORDER — PROPOFOL 10 MG/ML
VIAL (ML) INTRAVENOUS AS NEEDED
Status: DISCONTINUED | OUTPATIENT
Start: 2020-03-16 | End: 2020-03-16 | Stop reason: SURG

## 2020-03-16 RX ADMIN — SODIUM CHLORIDE 100 ML/HR: 9 INJECTION, SOLUTION INTRAVENOUS at 09:49

## 2020-03-16 RX ADMIN — PROPOFOL 300 MG: 10 INJECTION, EMULSION INTRAVENOUS at 11:06

## 2020-03-16 NOTE — ANESTHESIA POSTPROCEDURE EVALUATION
"Patient: Jair Spencer    Procedure Summary     Date:  03/16/20 Room / Location:  [facility] ENDOSCOPY 4 / [facility]    Anesthesia Start:  1105 Anesthesia Stop:  1148    Procedure:  COLONOSCOPY WITH ANESTHESIA (N/A ) Diagnosis:       Encounter for screening for malignant neoplasm of colon      (Encounter for screening for malignant neoplasm of colon [Z12.11])    Surgeon:  Espinoza Ames MD Provider:  Nestor Espinosa CRNA    Anesthesia Type:  MAC ASA Status:  2          Anesthesia Type: MAC    Vitals  Vitals Value Taken Time   /81 3/16/2020 11:45 AM   Temp     Pulse 65 3/16/2020 11:47 AM   Resp 19 3/16/2020 11:45 AM   SpO2 100 % 3/16/2020 11:47 AM   Vitals shown include unvalidated device data.        Post Anesthesia Care and Evaluation    Patient location during evaluation: PACU  Patient participation: complete - patient participated  Level of consciousness: awake and alert  Pain management: adequate  Airway patency: patent  Anesthetic complications: No anesthetic complications    Cardiovascular status: acceptable  Respiratory status: acceptable  Hydration status: acceptable    Comments: Blood pressure 122/81, pulse 65, temperature 97.6 °F (36.4 °C), temperature source Temporal, resp. rate 19, height 180.3 cm (71\"), weight 98.9 kg (218 lb), SpO2 100 %.    Pt discharged from PACU based on minh score >8      "

## 2020-03-16 NOTE — ANESTHESIA PREPROCEDURE EVALUATION
Anesthesia Evaluation     no history of anesthetic complications:  NPO Solid Status: > 8 hours  NPO Liquid Status: > 4 hours           Airway   Mallampati: I  TM distance: >3 FB  Neck ROM: full  Dental - normal exam     Pulmonary - normal exam    breath sounds clear to auscultation  (-) asthma, recent URI, sleep apnea, not a smoker  Cardiovascular - normal exam  Exercise tolerance: good (4-7 METS)    Rhythm: regular  Rate: normal    (+) hypertension,   (-) pacemaker, past MI, angina, cardiac stents, CABG      Neuro/Psych  (-) seizures, TIA, CVA  GI/Hepatic/Renal/Endo    (-) GERD, liver disease, no renal disease, diabetes, no thyroid disorder    Musculoskeletal     Abdominal    Substance History      OB/GYN          Other                        Anesthesia Plan    ASA 2     MAC     intravenous induction     Anesthetic plan, all risks, benefits, and alternatives have been provided, discussed and informed consent has been obtained with: patient.

## 2020-03-17 LAB
CYTO UR: NORMAL
LAB AP CASE REPORT: NORMAL
PATH REPORT.FINAL DX SPEC: NORMAL
PATH REPORT.GROSS SPEC: NORMAL

## 2022-11-30 PROCEDURE — 87636 SARSCOV2 & INF A&B AMP PRB: CPT | Performed by: NURSE PRACTITIONER

## 2023-06-09 ENCOUNTER — TRANSCRIBE ORDERS (OUTPATIENT)
Dept: ADMINISTRATIVE | Facility: HOSPITAL | Age: 58
End: 2023-06-09
Payer: COMMERCIAL

## 2023-06-09 DIAGNOSIS — R06.09 DYSPNEA ON EXERTION: Primary | ICD-10-CM

## 2023-07-24 ENCOUNTER — HOSPITAL ENCOUNTER (OUTPATIENT)
Dept: CARDIOLOGY | Facility: HOSPITAL | Age: 58
Discharge: HOME | End: 2023-07-24
Admitting: FAMILY MEDICINE
Payer: COMMERCIAL

## 2023-07-24 VITALS
HEIGHT: 71 IN | DIASTOLIC BLOOD PRESSURE: 80 MMHG | SYSTOLIC BLOOD PRESSURE: 133 MMHG | BODY MASS INDEX: 32.72 KG/M2 | HEART RATE: 5 BPM | WEIGHT: 233.69 LBS

## 2023-07-24 DIAGNOSIS — R06.09 DYSPNEA ON EXERTION: ICD-10-CM

## 2023-07-24 PROCEDURE — 93350 STRESS TTE ONLY: CPT

## 2023-07-24 PROCEDURE — 93352 ADMIN ECG CONTRAST AGENT: CPT | Performed by: INTERNAL MEDICINE

## 2023-07-24 PROCEDURE — 93350 STRESS TTE ONLY: CPT | Performed by: INTERNAL MEDICINE

## 2023-07-24 PROCEDURE — 93017 CV STRESS TEST TRACING ONLY: CPT

## 2023-07-24 PROCEDURE — 25510000001 PERFLUTREN 6.52 MG/ML SUSPENSION: Performed by: INTERNAL MEDICINE

## 2023-07-24 PROCEDURE — 93018 CV STRESS TEST I&R ONLY: CPT | Performed by: INTERNAL MEDICINE

## 2023-07-24 RX ADMIN — PERFLUTREN 8.48 MG: 6.52 INJECTION, SUSPENSION INTRAVENOUS at 10:07

## 2023-07-25 LAB
BH CV STRESS BP STAGE 1: NORMAL
BH CV STRESS BP STAGE 2: NORMAL
BH CV STRESS BP STAGE 3: NORMAL
BH CV STRESS DURATION MIN STAGE 1: 3
BH CV STRESS DURATION MIN STAGE 2: 3
BH CV STRESS DURATION MIN STAGE 3: 2
BH CV STRESS DURATION SEC STAGE 1: 0
BH CV STRESS DURATION SEC STAGE 2: 0
BH CV STRESS DURATION SEC STAGE 3: 3
BH CV STRESS GRADE STAGE 1: 10
BH CV STRESS GRADE STAGE 2: 12
BH CV STRESS GRADE STAGE 3: 14
BH CV STRESS HR STAGE 1: 116
BH CV STRESS HR STAGE 2: 136
BH CV STRESS HR STAGE 3: 151
BH CV STRESS METS STAGE 1: 5
BH CV STRESS METS STAGE 2: 7.5
BH CV STRESS METS STAGE 3: 10
BH CV STRESS PROTOCOL 1: NORMAL
BH CV STRESS RECOVERY BP: NORMAL MMHG
BH CV STRESS RECOVERY HR: 99 BPM
BH CV STRESS SPEED STAGE 1: 1.7
BH CV STRESS SPEED STAGE 2: 2.5
BH CV STRESS SPEED STAGE 3: 3.4
BH CV STRESS STAGE 1: 1
BH CV STRESS STAGE 2: 2
BH CV STRESS STAGE 3: 3
MAXIMAL PREDICTED HEART RATE: 164 BPM
PERCENT MAX PREDICTED HR: 92.07 %
STRESS BASELINE BP: NORMAL MMHG
STRESS BASELINE HR: 75 BPM
STRESS PERCENT HR: 108 %
STRESS POST ESTIMATED WORKLOAD: 10 METS
STRESS POST EXERCISE DUR MIN: 8 MIN
STRESS POST EXERCISE DUR SEC: 3 SEC
STRESS POST PEAK BP: NORMAL MMHG
STRESS POST PEAK HR: 151 BPM
STRESS TARGET HR: 139 BPM

## 2023-08-18 ENCOUNTER — TRANSCRIBE ORDERS (OUTPATIENT)
Dept: ADMINISTRATIVE | Facility: HOSPITAL | Age: 58
End: 2023-08-18
Payer: COMMERCIAL

## 2023-08-18 DIAGNOSIS — M54.16 LUMBAR RADICULOPATHY, ACUTE: Primary | ICD-10-CM

## 2023-09-14 ENCOUNTER — HOSPITAL ENCOUNTER (OUTPATIENT)
Dept: MRI IMAGING | Facility: HOSPITAL | Age: 58
Discharge: HOME | End: 2023-09-14
Admitting: FAMILY MEDICINE
Payer: COMMERCIAL

## 2023-09-14 DIAGNOSIS — M54.16 LUMBAR RADICULOPATHY, ACUTE: ICD-10-CM

## 2023-09-14 PROCEDURE — 72148 MRI LUMBAR SPINE W/O DYE: CPT

## (undated) DEVICE — Device: Brand: DEFENDO AIR/WATER/SUCTION AND BIOPSY VALVE

## (undated) DEVICE — THE CHANNEL CLEANING BRUSH IS A NYLON FLEXI BRUSH ATTACHED TO A FLEXIBLE PLASTIC SHEATH DESIGNED TO SAFELY REMOVE DEBRIS FROM FLEXIBLE ENDOSCOPES.

## (undated) DEVICE — CUFF,BP,DISP,1 TUBE,ADULT,HP: Brand: MEDLINE

## (undated) DEVICE — YANKAUER,BULB TIP WITH VENT: Brand: ARGYLE

## (undated) DEVICE — SNAR POLYP SENSATION MICRO OVL 13 240X40

## (undated) DEVICE — THE SINGLE USE ETRAP – POLYP TRAP IS USED FOR SUCTION RETRIEVAL OF ENDOSCOPICALLY REMOVED POLYPS.: Brand: ETRAP

## (undated) DEVICE — MASK,OXYGEN,MED CONC,ADLT,7' TUB, UC: Brand: PENDING

## (undated) DEVICE — TBG SMPL FLTR LINE NASL 02/C02 A/ BX/100

## (undated) DEVICE — SENSR O2 OXIMAX FNGR A/ 18IN NONSTR